# Patient Record
Sex: FEMALE | Race: WHITE | URBAN - NONMETROPOLITAN AREA
[De-identification: names, ages, dates, MRNs, and addresses within clinical notes are randomized per-mention and may not be internally consistent; named-entity substitution may affect disease eponyms.]

---

## 2024-02-04 ENCOUNTER — APPOINTMENT (OUTPATIENT)
Dept: GENERAL RADIOLOGY | Age: 59
DRG: 133 | End: 2024-02-04
Payer: MEDICAID

## 2024-02-04 ENCOUNTER — APPOINTMENT (OUTPATIENT)
Dept: CT IMAGING | Age: 59
DRG: 133 | End: 2024-02-04
Payer: MEDICAID

## 2024-02-04 ENCOUNTER — HOSPITAL ENCOUNTER (INPATIENT)
Age: 59
LOS: 3 days | Discharge: HOME | DRG: 133 | End: 2024-02-07
Attending: EMERGENCY MEDICINE | Admitting: STUDENT IN AN ORGANIZED HEALTH CARE EDUCATION/TRAINING PROGRAM
Payer: MEDICAID

## 2024-02-04 DIAGNOSIS — J44.1 COPD EXACERBATION (HCC): ICD-10-CM

## 2024-02-04 DIAGNOSIS — I48.91 NEW ONSET ATRIAL FIBRILLATION (HCC): ICD-10-CM

## 2024-02-04 DIAGNOSIS — J96.02 ACUTE RESPIRATORY FAILURE WITH HYPOXIA AND HYPERCAPNIA (HCC): Primary | ICD-10-CM

## 2024-02-04 DIAGNOSIS — J96.01 ACUTE RESPIRATORY FAILURE WITH HYPOXIA AND HYPERCAPNIA (HCC): Primary | ICD-10-CM

## 2024-02-04 DIAGNOSIS — J44.9 OSA AND COPD OVERLAP SYNDROME (HCC): ICD-10-CM

## 2024-02-04 DIAGNOSIS — G47.33 OSA AND COPD OVERLAP SYNDROME (HCC): ICD-10-CM

## 2024-02-04 PROBLEM — R07.9 CHEST PAIN: Status: ACTIVE | Noted: 2024-02-04

## 2024-02-04 PROBLEM — R94.31 ACUTE ELECTROCARDIOGRAM CHANGES: Status: ACTIVE | Noted: 2024-02-04

## 2024-02-04 PROBLEM — R79.89 ELEVATED TROPONIN: Status: ACTIVE | Noted: 2024-02-04

## 2024-02-04 LAB
ANION GAP SERPL CALCULATED.3IONS-SCNC: 12 MMOL/L (ref 9–17)
ARTERIAL PATENCY WRIST A: ABNORMAL
BASOPHILS # BLD: 0 K/UL (ref 0–0.2)
BASOPHILS NFR BLD: 0 % (ref 0–2)
BDY SITE: ABNORMAL
BNP SERPL-MCNC: 191 PG/ML
BODY TEMPERATURE: 37
BUN SERPL-MCNC: 9 MG/DL (ref 6–20)
BUN/CREAT SERPL: 18 (ref 9–20)
CALCIUM SERPL-MCNC: 9.3 MG/DL (ref 8.6–10.4)
CHLORIDE SERPL-SCNC: 95 MMOL/L (ref 98–107)
CO2 SERPL-SCNC: 26 MMOL/L (ref 20–31)
CREAT SERPL-MCNC: 0.5 MG/DL (ref 0.5–0.9)
CRP SERPL HS-MCNC: 61.8 MG/L (ref 0–5)
EKG ATRIAL RATE: 103 BPM
EKG ATRIAL RATE: 104 BPM
EKG P AXIS: 68 DEGREES
EKG P AXIS: 72 DEGREES
EKG P-R INTERVAL: 122 MS
EKG P-R INTERVAL: 126 MS
EKG Q-T INTERVAL: 334 MS
EKG Q-T INTERVAL: 338 MS
EKG QRS DURATION: 62 MS
EKG QRS DURATION: 66 MS
EKG QTC CALCULATION (BAZETT): 437 MS
EKG QTC CALCULATION (BAZETT): 444 MS
EKG R AXIS: 22 DEGREES
EKG R AXIS: 24 DEGREES
EKG T AXIS: 34 DEGREES
EKG T AXIS: 38 DEGREES
EKG VENTRICULAR RATE: 103 BPM
EKG VENTRICULAR RATE: 104 BPM
EOSINOPHIL # BLD: 0 K/UL (ref 0–0.44)
EOSINOPHILS RELATIVE PERCENT: 0 % (ref 1–4)
ERYTHROCYTE [DISTWIDTH] IN BLOOD BY AUTOMATED COUNT: 19.3 % (ref 11.8–14.4)
ERYTHROCYTE [SEDIMENTATION RATE] IN BLOOD BY PHOTOMETRIC METHOD: 48 MM/HR (ref 0–30)
FIO2 ON VENT: 35 %
FLUAV AG SPEC QL: NEGATIVE
FLUBV AG SPEC QL: NEGATIVE
GAS FLOW.O2 O2 DELIVERY SYS: ABNORMAL L/MIN
GFR SERPL CREATININE-BSD FRML MDRD: >60 ML/MIN/1.73M2
GLUCOSE SERPL-MCNC: 143 MG/DL (ref 70–99)
HCO3 ARTERIAL: 28.3 MMOL/L (ref 22–26)
HCT VFR BLD AUTO: 64 % (ref 36.3–47.1)
HGB BLD-MCNC: 19.8 G/DL (ref 11.9–15.1)
IMM GRANULOCYTES # BLD AUTO: 0.13 K/UL (ref 0–0.3)
IMM GRANULOCYTES NFR BLD: 1 %
LACTATE BLDV-SCNC: 1.1 MMOL/L (ref 0.5–2.2)
LYMPHOCYTES NFR BLD: 1.07 K/UL (ref 1.1–3.7)
LYMPHOCYTES RELATIVE PERCENT: 8 % (ref 24–43)
MCH RBC QN AUTO: 26.4 PG (ref 25.2–33.5)
MCHC RBC AUTO-ENTMCNC: 30.9 G/DL (ref 28.4–34.8)
MCV RBC AUTO: 85.3 FL (ref 82.6–102.9)
MONOCYTES NFR BLD: 1.21 K/UL (ref 0.1–1.2)
MONOCYTES NFR BLD: 9 % (ref 3–12)
MORPHOLOGY: NORMAL
NEGATIVE BASE EXCESS, ART: 1 MMOL/L (ref 0–2)
NEUTROPHILS NFR BLD: 82 % (ref 36–65)
NEUTS SEG NFR BLD: 10.99 K/UL (ref 1.5–8.1)
NRBC BLD-RTO: 0 PER 100 WBC
O2 SAT, ARTERIAL: 90.8 % (ref 95–98)
PCO2 ARTERIAL: 68.1 MMHG (ref 35–45)
PH ARTERIAL: 7.24 (ref 7.35–7.45)
PLATELET # BLD AUTO: 192 K/UL (ref 138–453)
PMV BLD AUTO: 10.4 FL (ref 8.1–13.5)
PO2 ARTERIAL: 71.1 MMHG (ref 80–100)
POTASSIUM SERPL-SCNC: 4.7 MMOL/L (ref 3.7–5.3)
PT. POSITION: ABNORMAL
RBC # BLD AUTO: 7.5 M/UL (ref 3.95–5.11)
SARS-COV-2 RDRP RESP QL NAA+PROBE: NOT DETECTED
SODIUM SERPL-SCNC: 133 MMOL/L (ref 135–144)
SPECIMEN DESCRIPTION: NORMAL
TROPONIN I SERPL HS-MCNC: 18 NG/L (ref 0–14)
TROPONIN I SERPL HS-MCNC: 19 NG/L (ref 0–14)
WBC OTHER # BLD: 13.4 K/UL (ref 3.5–11.3)

## 2024-02-04 PROCEDURE — 87804 INFLUENZA ASSAY W/OPTIC: CPT

## 2024-02-04 PROCEDURE — 71260 CT THORAX DX C+: CPT

## 2024-02-04 PROCEDURE — 94664 DEMO&/EVAL PT USE INHALER: CPT

## 2024-02-04 PROCEDURE — 84484 ASSAY OF TROPONIN QUANT: CPT

## 2024-02-04 PROCEDURE — 86140 C-REACTIVE PROTEIN: CPT

## 2024-02-04 PROCEDURE — 2580000003 HC RX 258

## 2024-02-04 PROCEDURE — 6360000002 HC RX W HCPCS: Performed by: STUDENT IN AN ORGANIZED HEALTH CARE EDUCATION/TRAINING PROGRAM

## 2024-02-04 PROCEDURE — 96374 THER/PROPH/DIAG INJ IV PUSH: CPT

## 2024-02-04 PROCEDURE — 36600 WITHDRAWAL OF ARTERIAL BLOOD: CPT

## 2024-02-04 PROCEDURE — 83036 HEMOGLOBIN GLYCOSYLATED A1C: CPT

## 2024-02-04 PROCEDURE — 6370000000 HC RX 637 (ALT 250 FOR IP): Performed by: EMERGENCY MEDICINE

## 2024-02-04 PROCEDURE — 94640 AIRWAY INHALATION TREATMENT: CPT

## 2024-02-04 PROCEDURE — 83880 ASSAY OF NATRIURETIC PEPTIDE: CPT

## 2024-02-04 PROCEDURE — 85652 RBC SED RATE AUTOMATED: CPT

## 2024-02-04 PROCEDURE — 6360000002 HC RX W HCPCS: Performed by: EMERGENCY MEDICINE

## 2024-02-04 PROCEDURE — 6360000004 HC RX CONTRAST MEDICATION: Performed by: EMERGENCY MEDICINE

## 2024-02-04 PROCEDURE — 2700000000 HC OXYGEN THERAPY PER DAY

## 2024-02-04 PROCEDURE — 87635 SARS-COV-2 COVID-19 AMP PRB: CPT

## 2024-02-04 PROCEDURE — 82805 BLOOD GASES W/O2 SATURATION: CPT

## 2024-02-04 PROCEDURE — 80048 BASIC METABOLIC PNL TOTAL CA: CPT

## 2024-02-04 PROCEDURE — 1200000000 HC SEMI PRIVATE

## 2024-02-04 PROCEDURE — 99285 EMERGENCY DEPT VISIT HI MDM: CPT

## 2024-02-04 PROCEDURE — 96375 TX/PRO/DX INJ NEW DRUG ADDON: CPT

## 2024-02-04 PROCEDURE — 2580000003 HC RX 258: Performed by: STUDENT IN AN ORGANIZED HEALTH CARE EDUCATION/TRAINING PROGRAM

## 2024-02-04 PROCEDURE — 6370000000 HC RX 637 (ALT 250 FOR IP): Performed by: STUDENT IN AN ORGANIZED HEALTH CARE EDUCATION/TRAINING PROGRAM

## 2024-02-04 PROCEDURE — 94761 N-INVAS EAR/PLS OXIMETRY MLT: CPT

## 2024-02-04 PROCEDURE — 85025 COMPLETE CBC W/AUTO DIFF WBC: CPT

## 2024-02-04 PROCEDURE — 93010 ELECTROCARDIOGRAM REPORT: CPT | Performed by: INTERNAL MEDICINE

## 2024-02-04 PROCEDURE — 83605 ASSAY OF LACTIC ACID: CPT

## 2024-02-04 PROCEDURE — 93005 ELECTROCARDIOGRAM TRACING: CPT | Performed by: EMERGENCY MEDICINE

## 2024-02-04 RX ORDER — ALBUTEROL SULFATE 90 UG/1
2 AEROSOL, METERED RESPIRATORY (INHALATION) 3 TIMES DAILY PRN
COMMUNITY

## 2024-02-04 RX ORDER — SODIUM CHLORIDE 0.9 % (FLUSH) 0.9 %
5-40 SYRINGE (ML) INJECTION EVERY 12 HOURS SCHEDULED
Status: DISCONTINUED | OUTPATIENT
Start: 2024-02-04 | End: 2024-02-07 | Stop reason: HOSPADM

## 2024-02-04 RX ORDER — ACETAMINOPHEN 325 MG/1
650 TABLET ORAL EVERY 6 HOURS PRN
Status: DISCONTINUED | OUTPATIENT
Start: 2024-02-04 | End: 2024-02-07 | Stop reason: HOSPADM

## 2024-02-04 RX ORDER — GUAIFENESIN/DEXTROMETHORPHAN 100-10MG/5
5 SYRUP ORAL EVERY 4 HOURS PRN
Status: DISCONTINUED | OUTPATIENT
Start: 2024-02-04 | End: 2024-02-07 | Stop reason: HOSPADM

## 2024-02-04 RX ORDER — ALBUTEROL SULFATE 2.5 MG/3ML
2.5 SOLUTION RESPIRATORY (INHALATION)
Status: DISCONTINUED | OUTPATIENT
Start: 2024-02-04 | End: 2024-02-06

## 2024-02-04 RX ORDER — ONDANSETRON 4 MG/1
4 TABLET, ORALLY DISINTEGRATING ORAL EVERY 8 HOURS PRN
Status: DISCONTINUED | OUTPATIENT
Start: 2024-02-04 | End: 2024-02-07 | Stop reason: HOSPADM

## 2024-02-04 RX ORDER — ACETAMINOPHEN 650 MG/1
650 SUPPOSITORY RECTAL EVERY 6 HOURS PRN
Status: DISCONTINUED | OUTPATIENT
Start: 2024-02-04 | End: 2024-02-07 | Stop reason: HOSPADM

## 2024-02-04 RX ORDER — ACETAMINOPHEN 500 MG
1000 TABLET ORAL EVERY 6 HOURS PRN
COMMUNITY

## 2024-02-04 RX ORDER — IPRATROPIUM BROMIDE AND ALBUTEROL SULFATE 2.5; .5 MG/3ML; MG/3ML
1 SOLUTION RESPIRATORY (INHALATION)
Status: DISCONTINUED | OUTPATIENT
Start: 2024-02-04 | End: 2024-02-05

## 2024-02-04 RX ORDER — IPRATROPIUM BROMIDE AND ALBUTEROL SULFATE 2.5; .5 MG/3ML; MG/3ML
1 SOLUTION RESPIRATORY (INHALATION)
Status: DISCONTINUED | OUTPATIENT
Start: 2024-02-04 | End: 2024-02-04

## 2024-02-04 RX ORDER — FAMOTIDINE 20 MG/1
20 TABLET, FILM COATED ORAL 2 TIMES DAILY
Status: DISCONTINUED | OUTPATIENT
Start: 2024-02-04 | End: 2024-02-07 | Stop reason: HOSPADM

## 2024-02-04 RX ORDER — FUROSEMIDE 20 MG/1
20 TABLET ORAL DAILY PRN
COMMUNITY

## 2024-02-04 RX ORDER — LEVOFLOXACIN 5 MG/ML
750 INJECTION, SOLUTION INTRAVENOUS ONCE
Status: COMPLETED | OUTPATIENT
Start: 2024-02-04 | End: 2024-02-04

## 2024-02-04 RX ORDER — SODIUM CHLORIDE 0.9 % (FLUSH) 0.9 %
5-40 SYRINGE (ML) INJECTION PRN
Status: DISCONTINUED | OUTPATIENT
Start: 2024-02-04 | End: 2024-02-07 | Stop reason: HOSPADM

## 2024-02-04 RX ORDER — IPRATROPIUM BROMIDE AND ALBUTEROL SULFATE 2.5; .5 MG/3ML; MG/3ML
1 SOLUTION RESPIRATORY (INHALATION) ONCE
Status: COMPLETED | OUTPATIENT
Start: 2024-02-04 | End: 2024-02-04

## 2024-02-04 RX ORDER — METHYLPREDNISOLONE SODIUM SUCCINATE 125 MG/2ML
125 INJECTION, POWDER, LYOPHILIZED, FOR SOLUTION INTRAMUSCULAR; INTRAVENOUS ONCE
Status: COMPLETED | OUTPATIENT
Start: 2024-02-04 | End: 2024-02-04

## 2024-02-04 RX ORDER — WATER 10 ML/10ML
INJECTION INTRAMUSCULAR; INTRAVENOUS; SUBCUTANEOUS
Status: COMPLETED
Start: 2024-02-04 | End: 2024-02-04

## 2024-02-04 RX ORDER — ALBUTEROL SULFATE 2.5 MG/3ML
2.5 SOLUTION RESPIRATORY (INHALATION) EVERY 4 HOURS PRN
Status: DISCONTINUED | OUTPATIENT
Start: 2024-02-04 | End: 2024-02-05 | Stop reason: SDUPTHER

## 2024-02-04 RX ORDER — GUAIFENESIN 600 MG/1
600 TABLET, EXTENDED RELEASE ORAL 2 TIMES DAILY
Status: DISCONTINUED | OUTPATIENT
Start: 2024-02-04 | End: 2024-02-07 | Stop reason: HOSPADM

## 2024-02-04 RX ORDER — POLYETHYLENE GLYCOL 3350 17 G/17G
17 POWDER, FOR SOLUTION ORAL DAILY PRN
Status: DISCONTINUED | OUTPATIENT
Start: 2024-02-04 | End: 2024-02-07 | Stop reason: HOSPADM

## 2024-02-04 RX ORDER — ONDANSETRON 2 MG/ML
4 INJECTION INTRAMUSCULAR; INTRAVENOUS EVERY 6 HOURS PRN
Status: DISCONTINUED | OUTPATIENT
Start: 2024-02-04 | End: 2024-02-07 | Stop reason: HOSPADM

## 2024-02-04 RX ORDER — SODIUM CHLORIDE 9 MG/ML
INJECTION, SOLUTION INTRAVENOUS PRN
Status: DISCONTINUED | OUTPATIENT
Start: 2024-02-04 | End: 2024-02-07 | Stop reason: HOSPADM

## 2024-02-04 RX ORDER — ASPIRIN 81 MG/1
324 TABLET, CHEWABLE ORAL ONCE
Status: COMPLETED | OUTPATIENT
Start: 2024-02-04 | End: 2024-02-04

## 2024-02-04 RX ORDER — PREDNISONE 20 MG/1
40 TABLET ORAL DAILY
Status: DISCONTINUED | OUTPATIENT
Start: 2024-02-06 | End: 2024-02-05

## 2024-02-04 RX ORDER — ENOXAPARIN SODIUM 100 MG/ML
40 INJECTION SUBCUTANEOUS DAILY
Status: DISCONTINUED | OUTPATIENT
Start: 2024-02-04 | End: 2024-02-05

## 2024-02-04 RX ORDER — ALBUTEROL SULFATE 2.5 MG/3ML
2.5 SOLUTION RESPIRATORY (INHALATION)
Status: DISCONTINUED | OUTPATIENT
Start: 2024-02-04 | End: 2024-02-04

## 2024-02-04 RX ORDER — MORPHINE SULFATE 4 MG/ML
4 INJECTION, SOLUTION INTRAMUSCULAR; INTRAVENOUS ONCE
Status: COMPLETED | OUTPATIENT
Start: 2024-02-04 | End: 2024-02-04

## 2024-02-04 RX ORDER — FENTANYL CITRATE 50 UG/ML
25 INJECTION, SOLUTION INTRAMUSCULAR; INTRAVENOUS ONCE
Status: COMPLETED | OUTPATIENT
Start: 2024-02-04 | End: 2024-02-04

## 2024-02-04 RX ORDER — ONDANSETRON 2 MG/ML
4 INJECTION INTRAMUSCULAR; INTRAVENOUS ONCE
Status: COMPLETED | OUTPATIENT
Start: 2024-02-04 | End: 2024-02-04

## 2024-02-04 RX ORDER — FUROSEMIDE 10 MG/ML
40 INJECTION INTRAMUSCULAR; INTRAVENOUS ONCE
Status: COMPLETED | OUTPATIENT
Start: 2024-02-04 | End: 2024-02-04

## 2024-02-04 RX ORDER — BENZONATATE 100 MG/1
100 CAPSULE ORAL 3 TIMES DAILY PRN
Status: DISCONTINUED | OUTPATIENT
Start: 2024-02-04 | End: 2024-02-07 | Stop reason: HOSPADM

## 2024-02-04 RX ADMIN — SODIUM CHLORIDE, PRESERVATIVE FREE 10 ML: 5 INJECTION INTRAVENOUS at 12:30

## 2024-02-04 RX ADMIN — FUROSEMIDE 40 MG: 10 INJECTION, SOLUTION INTRAMUSCULAR; INTRAVENOUS at 10:23

## 2024-02-04 RX ADMIN — FAMOTIDINE 20 MG: 20 TABLET, FILM COATED ORAL at 21:03

## 2024-02-04 RX ADMIN — METHYLPREDNISOLONE SODIUM SUCCINATE 40 MG: 40 INJECTION, POWDER, FOR SOLUTION INTRAMUSCULAR; INTRAVENOUS at 17:13

## 2024-02-04 RX ADMIN — IPRATROPIUM BROMIDE AND ALBUTEROL SULFATE 1 DOSE: .5; 3 SOLUTION RESPIRATORY (INHALATION) at 10:35

## 2024-02-04 RX ADMIN — FENTANYL CITRATE 25 MCG: 50 INJECTION, SOLUTION INTRAMUSCULAR; INTRAVENOUS at 09:56

## 2024-02-04 RX ADMIN — MORPHINE SULFATE 4 MG: 4 INJECTION, SOLUTION INTRAMUSCULAR; INTRAVENOUS at 08:38

## 2024-02-04 RX ADMIN — WATER 2 ML: 1 INJECTION INTRAMUSCULAR; INTRAVENOUS; SUBCUTANEOUS at 10:29

## 2024-02-04 RX ADMIN — ASPIRIN 324 MG: 81 TABLET, CHEWABLE ORAL at 09:31

## 2024-02-04 RX ADMIN — LEVOFLOXACIN 750 MG: 750 INJECTION, SOLUTION INTRAVENOUS at 10:24

## 2024-02-04 RX ADMIN — SODIUM CHLORIDE, PRESERVATIVE FREE 10 ML: 5 INJECTION INTRAVENOUS at 21:05

## 2024-02-04 RX ADMIN — METHYLPREDNISOLONE SODIUM SUCCINATE 40 MG: 40 INJECTION, POWDER, FOR SOLUTION INTRAMUSCULAR; INTRAVENOUS at 21:04

## 2024-02-04 RX ADMIN — IPRATROPIUM BROMIDE AND ALBUTEROL SULFATE 1 DOSE: .5; 3 SOLUTION RESPIRATORY (INHALATION) at 20:45

## 2024-02-04 RX ADMIN — GUAIFENESIN 600 MG: 600 TABLET, EXTENDED RELEASE ORAL at 21:03

## 2024-02-04 RX ADMIN — IPRATROPIUM BROMIDE AND ALBUTEROL SULFATE 1 DOSE: .5; 3 SOLUTION RESPIRATORY (INHALATION) at 15:45

## 2024-02-04 RX ADMIN — CEFTRIAXONE SODIUM 1000 MG: 1 INJECTION, POWDER, FOR SOLUTION INTRAMUSCULAR; INTRAVENOUS at 13:49

## 2024-02-04 RX ADMIN — IOPAMIDOL 75 ML: 755 INJECTION, SOLUTION INTRAVENOUS at 09:32

## 2024-02-04 RX ADMIN — METHYLPREDNISOLONE SODIUM SUCCINATE 125 MG: 125 INJECTION INTRAMUSCULAR; INTRAVENOUS at 10:29

## 2024-02-04 RX ADMIN — AZITHROMYCIN MONOHYDRATE 500 MG: 500 INJECTION, POWDER, LYOPHILIZED, FOR SOLUTION INTRAVENOUS at 14:35

## 2024-02-04 RX ADMIN — ONDANSETRON 4 MG: 2 INJECTION INTRAMUSCULAR; INTRAVENOUS at 08:38

## 2024-02-04 RX ADMIN — ENOXAPARIN SODIUM 40 MG: 100 INJECTION SUBCUTANEOUS at 13:49

## 2024-02-04 RX ADMIN — GUAIFENESIN 600 MG: 600 TABLET, EXTENDED RELEASE ORAL at 13:50

## 2024-02-04 ASSESSMENT — PAIN DESCRIPTION - LOCATION
LOCATION: CHEST
LOCATION: CHEST

## 2024-02-04 ASSESSMENT — PAIN SCALES - GENERAL
PAINLEVEL_OUTOF10: 10
PAINLEVEL_OUTOF10: 1
PAINLEVEL_OUTOF10: 10
PAINLEVEL_OUTOF10: 0

## 2024-02-04 ASSESSMENT — PAIN DESCRIPTION - PAIN TYPE: TYPE: ACUTE PAIN

## 2024-02-04 ASSESSMENT — PAIN DESCRIPTION - ORIENTATION: ORIENTATION: ANTERIOR

## 2024-02-04 ASSESSMENT — LIFESTYLE VARIABLES
HOW MANY STANDARD DRINKS CONTAINING ALCOHOL DO YOU HAVE ON A TYPICAL DAY: 1 OR 2
HOW OFTEN DO YOU HAVE A DRINK CONTAINING ALCOHOL: MONTHLY OR LESS
HOW OFTEN DO YOU HAVE A DRINK CONTAINING ALCOHOL: NEVER
HOW MANY STANDARD DRINKS CONTAINING ALCOHOL DO YOU HAVE ON A TYPICAL DAY: PATIENT DOES NOT DRINK

## 2024-02-04 ASSESSMENT — PAIN DESCRIPTION - DESCRIPTORS: DESCRIPTORS: PRESSURE

## 2024-02-04 ASSESSMENT — PAIN DESCRIPTION - FREQUENCY: FREQUENCY: CONTINUOUS

## 2024-02-04 ASSESSMENT — PAIN - FUNCTIONAL ASSESSMENT: PAIN_FUNCTIONAL_ASSESSMENT: 0-10

## 2024-02-04 NOTE — ED PROVIDER NOTES
hypertension.   3. Fatty liver.   4. Atheromatous plaque and atherosclerotic calcification of the aorta and   branch vasculature.      RECOMMENDATIONS:   Multiple pulmonary nodules. Most significant: 5 mm left solid pulmonary   nodule within the upper lobe. Per Fleischner Society Guidelines, if patient   is low risk for malignancy, no routine follow-up imaging is recommended. If   patient is high risk for malignancy, a non-contrast Chest CT at 12 months is   optional. If performed and the nodule is stable at 12 months, no further   follow-up is recommended.      These guidelines do not apply to immunocompromised patients and patients with   cancer. Follow up in patients with significant comorbidities as clinically   warranted. For lung cancer screening, adhere to Lung-RADS guidelines.   Reference: Radiology. 2017; 284(1):228-43.                  ED BEDSIDE ULTRASOUND:   Performed by ED Physician - none    LABS:  Labs Reviewed   BASIC METABOLIC PANEL - Abnormal; Notable for the following components:       Result Value    Sodium 133 (*)     Chloride 95 (*)     Glucose 143 (*)     All other components within normal limits   CBC WITH AUTO DIFFERENTIAL - Abnormal; Notable for the following components:    WBC 13.4 (*)     RBC 7.50 (*)     Hemoglobin 19.8 (*)     Hematocrit 64.0 (*)     RDW 19.3 (*)     Neutrophils % 82 (*)     Lymphocytes % 8 (*)     Eosinophils % 0 (*)     Immature Granulocytes 1 (*)     Neutrophils Absolute 10.99 (*)     Lymphocytes Absolute 1.07 (*)     Monocytes Absolute 1.21 (*)     All other components within normal limits   TROPONIN - Abnormal; Notable for the following components:    Troponin, High Sensitivity 18 (*)     All other components within normal limits   C-REACTIVE PROTEIN - Abnormal; Notable for the following components:    CRP 61.8 (*)     All other components within normal limits   SEDIMENTATION RATE - Abnormal; Notable for the following components:    Sed Rate 48 (*)     All other

## 2024-02-04 NOTE — H&P
[address] , [place], [place], [address]    History & Physical Examination Note              Date:   2/4/2024  Patient name:  Sully Thomas  Date of admission:  2/4/2024  8:16 AM  MRN:   [id number]  YOB: 1965    CHIEF COMPLAINT:       Chief Complaint   Patient presents with    Shortness of Breath     with associated substernal CP starting last night, worsening with exertion       History Obtained From:  Patient and chart review.    HPI:     The patient is a 58 y.o.  female who presented to the ED with concerns regarding mid chest wall pain that started sometime last night.  Patient indicates that the chest pain has been associated with breathing and any exertion.  She has been having some associated shortness of breath which is worse than her baseline.  She does have a history of smoking for many years with suspected COPD/taking albuterol as needed.  She does not have any known history of CAD, CHF but had also been placed on a diuretics about 2 weeks prior for excessive fluid of the lower extremity which had since resolved.  She has no history of any known sick contacts, no fevers no chills.  She has been having a productive cough with associated phlegm.   In the ED, labs and imaging were obtained and were reviewed. The case was discussed with the ED Provider prior to admission.  Cardiology had been consulted in the ED given acute EKG changes.  She did have elevated troponins.  CT imaging has been reviewed.  She was noted to be in acute hypoxic respiratory failure likely secondary to COPD exacerbation.  She was provided with supplemental oxygen which she does not wear at home as well as breathing treatments.    PAST MEDICAL HISTORY:     History reviewed. No pertinent past medical history.  Reviewed and non-contributory or as noted above and/or in the HPI    PAST SURGICAL HISTORY:      has a past surgical history that includes Skin graft (Left, 1969); Carpal tunnel release

## 2024-02-05 ENCOUNTER — APPOINTMENT (OUTPATIENT)
Age: 59
DRG: 133 | End: 2024-02-05
Attending: STUDENT IN AN ORGANIZED HEALTH CARE EDUCATION/TRAINING PROGRAM
Payer: MEDICAID

## 2024-02-05 PROBLEM — I30.0 ACUTE IDIOPATHIC PERICARDITIS: Status: ACTIVE | Noted: 2024-02-05

## 2024-02-05 PROBLEM — E11.9 TYPE 2 DIABETES MELLITUS WITHOUT COMPLICATION, WITHOUT LONG-TERM CURRENT USE OF INSULIN (HCC): Status: ACTIVE | Noted: 2024-02-05

## 2024-02-05 LAB
ALBUMIN SERPL-MCNC: 3.6 G/DL (ref 3.5–5.2)
ALBUMIN/GLOB SERPL: 0.9 {RATIO} (ref 1–2.5)
ALP SERPL-CCNC: 78 U/L (ref 35–104)
ALT SERPL-CCNC: 11 U/L (ref 5–33)
ANION GAP SERPL CALCULATED.3IONS-SCNC: 18 MMOL/L (ref 9–17)
AST SERPL-CCNC: 19 U/L
BASOPHILS # BLD: 0.05 K/UL (ref 0–0.2)
BASOPHILS NFR BLD: 0 % (ref 0–2)
BILIRUB SERPL-MCNC: 0.3 MG/DL (ref 0.3–1.2)
BNP SERPL-MCNC: 888 PG/ML
BUN SERPL-MCNC: 14 MG/DL (ref 6–20)
BUN/CREAT SERPL: 20 (ref 9–20)
CALCIUM SERPL-MCNC: 9.4 MG/DL (ref 8.6–10.4)
CHLORIDE SERPL-SCNC: 94 MMOL/L (ref 98–107)
CHOLEST SERPL-MCNC: 206 MG/DL
CHOLESTEROL/HDL RATIO: 3.9
CO2 SERPL-SCNC: 22 MMOL/L (ref 20–31)
CREAT SERPL-MCNC: 0.7 MG/DL (ref 0.5–0.9)
CRP SERPL HS-MCNC: 242.1 MG/L (ref 0–5)
ECHO AO ROOT DIAM: 3.2 CM
ECHO AO ROOT INDEX: 1.57 CM/M2
ECHO AV ACCELERATION TIME: 59.4 MS
ECHO AV CUSP MM: 1.7 CM
ECHO AV MEAN GRADIENT: 4 MMHG
ECHO AV MEAN VELOCITY: 1 M/S
ECHO AV PEAK VELOCITY: 1.3 M/S
ECHO AV VTI: 24.5 CM
ECHO BSA: 2.11 M2
ECHO EST RA PRESSURE: 3 MMHG
ECHO LA DIAMETER INDEX: 2.16 CM/M2
ECHO LA DIAMETER: 4.4 CM
ECHO LA MAJOR AXIS: 5.2 CM
ECHO LA TO AORTIC ROOT RATIO: 1.38
ECHO LA VOL BP: 36 ML (ref 22–52)
ECHO LA VOL MOD A2C: 40 ML (ref 22–52)
ECHO LA VOL MOD A4C: 32 ML (ref 22–52)
ECHO LA VOL/BSA BIPLANE: 18 ML/M2 (ref 16–34)
ECHO LA VOLUME AREA LENGTH: 38 ML
ECHO LA VOLUME INDEX AREA LENGTH: 19 ML/M2 (ref 16–34)
ECHO LA VOLUME INDEX MOD A2C: 20 ML/M2 (ref 16–34)
ECHO LA VOLUME INDEX MOD A4C: 16 ML/M2 (ref 16–34)
ECHO LV E' LATERAL VELOCITY: 14 CM/S
ECHO LV EDV A2C: 26 ML
ECHO LV EDV A4C: 44 ML
ECHO LV EDV BP: 36 ML (ref 56–104)
ECHO LV EDV INDEX A4C: 22 ML/M2
ECHO LV EDV INDEX BP: 18 ML/M2
ECHO LV EDV NDEX A2C: 13 ML/M2
ECHO LV EJECTION FRACTION BIPLANE: 64 % (ref 55–100)
ECHO LV ESV A2C: 10 ML
ECHO LV ESV A4C: 17 ML
ECHO LV ESV BP: 13 ML (ref 19–49)
ECHO LV ESV INDEX A2C: 5 ML/M2
ECHO LV ESV INDEX A4C: 8 ML/M2
ECHO LV ESV INDEX BP: 6 ML/M2
ECHO LV FRACTIONAL SHORTENING: 42 % (ref 28–44)
ECHO LV INTERNAL DIMENSION DIASTOLE INDEX: 2.55 CM/M2
ECHO LV INTERNAL DIMENSION DIASTOLIC: 5.2 CM (ref 3.9–5.3)
ECHO LV INTERNAL DIMENSION SYSTOLIC INDEX: 1.47 CM/M2
ECHO LV INTERNAL DIMENSION SYSTOLIC: 3 CM
ECHO LV IVSD: 1.2 CM (ref 0.6–0.9)
ECHO LV IVSS: 1.4 CM
ECHO LV MASS 2D: 248.8 G (ref 67–162)
ECHO LV MASS INDEX 2D: 122 G/M2 (ref 43–95)
ECHO LV POSTERIOR WALL DIASTOLIC: 1.2 CM (ref 0.6–0.9)
ECHO LV POSTERIOR WALL SYSTOLIC: 1.5 CM
ECHO LV RELATIVE WALL THICKNESS RATIO: 0.46
ECHO MV A VELOCITY: 0.69 M/S
ECHO MV E DECELERATION TIME (DT): 189.7 MS
ECHO MV E VELOCITY: 0.82 M/S
ECHO MV E/A RATIO: 1.19
ECHO MV E/E' LATERAL: 5.86
ECHO PV MAX VELOCITY: 0.8 M/S
ECHO RIGHT VENTRICULAR SYSTOLIC PRESSURE (RVSP): 38 MMHG
ECHO TV REGURGITANT MAX VELOCITY: 2.96 M/S
EKG ATRIAL RATE: 101 BPM
EKG ATRIAL RATE: 163 BPM
EKG ATRIAL RATE: 86 BPM
EKG P AXIS: 72 DEGREES
EKG P AXIS: 77 DEGREES
EKG P-R INTERVAL: 122 MS
EKG P-R INTERVAL: 124 MS
EKG Q-T INTERVAL: 312 MS
EKG Q-T INTERVAL: 350 MS
EKG Q-T INTERVAL: 372 MS
EKG QRS DURATION: 84 MS
EKG QRS DURATION: 90 MS
EKG QRS DURATION: 96 MS
EKG QTC CALCULATION (BAZETT): 445 MS
EKG QTC CALCULATION (BAZETT): 453 MS
EKG QTC CALCULATION (BAZETT): 460 MS
EKG R AXIS: 40 DEGREES
EKG R AXIS: 57 DEGREES
EKG R AXIS: 67 DEGREES
EKG T AXIS: 22 DEGREES
EKG T AXIS: 30 DEGREES
EKG T AXIS: 39 DEGREES
EKG VENTRICULAR RATE: 101 BPM
EKG VENTRICULAR RATE: 131 BPM
EKG VENTRICULAR RATE: 86 BPM
EOSINOPHIL # BLD: <0.03 K/UL (ref 0–0.44)
EOSINOPHILS RELATIVE PERCENT: 0 % (ref 1–4)
ERYTHROCYTE [DISTWIDTH] IN BLOOD BY AUTOMATED COUNT: 18.8 % (ref 11.8–14.4)
EST. AVERAGE GLUCOSE BLD GHB EST-MCNC: 146 MG/DL
EST. AVERAGE GLUCOSE BLD GHB EST-MCNC: 146 MG/DL
GFR SERPL CREATININE-BSD FRML MDRD: >60 ML/MIN/1.73M2
GLUCOSE SERPL-MCNC: 150 MG/DL (ref 70–99)
HBA1C MFR BLD: 6.7 % (ref 4–6)
HBA1C MFR BLD: 6.7 % (ref 4–6)
HCT VFR BLD AUTO: 59.8 % (ref 36.3–47.1)
HDLC SERPL-MCNC: 53 MG/DL
HGB BLD-MCNC: 18.4 G/DL (ref 11.9–15.1)
IMM GRANULOCYTES # BLD AUTO: 0.1 K/UL (ref 0–0.3)
IMM GRANULOCYTES NFR BLD: 1 %
LDLC SERPL CALC-MCNC: 139 MG/DL (ref 0–130)
LYMPHOCYTES NFR BLD: 0.94 K/UL (ref 1.1–3.7)
LYMPHOCYTES RELATIVE PERCENT: 5 % (ref 24–43)
MCH RBC QN AUTO: 26.1 PG (ref 25.2–33.5)
MCHC RBC AUTO-ENTMCNC: 30.8 G/DL (ref 28.4–34.8)
MCV RBC AUTO: 84.8 FL (ref 82.6–102.9)
MONOCYTES NFR BLD: 1.16 K/UL (ref 0.1–1.2)
MONOCYTES NFR BLD: 6 % (ref 3–12)
NEUTROPHILS NFR BLD: 89 % (ref 36–65)
NEUTS SEG NFR BLD: 17.52 K/UL (ref 1.5–8.1)
NRBC BLD-RTO: 0 PER 100 WBC
PLATELET # BLD AUTO: 175 K/UL (ref 138–453)
PMV BLD AUTO: 10.4 FL (ref 8.1–13.5)
POTASSIUM SERPL-SCNC: 5.1 MMOL/L (ref 3.7–5.3)
PROCALCITONIN SERPL-MCNC: 0.17 NG/ML (ref 0–0.09)
PROT SERPL-MCNC: 7.6 G/DL (ref 6.4–8.3)
RBC # BLD AUTO: 7.05 M/UL (ref 3.95–5.11)
SODIUM SERPL-SCNC: 134 MMOL/L (ref 135–144)
T4 FREE SERPL-MCNC: 1 NG/DL (ref 0.93–1.7)
TRIGL SERPL-MCNC: 68 MG/DL
TROPONIN I SERPL HS-MCNC: 17 NG/L (ref 0–14)
TSH SERPL DL<=0.05 MIU/L-ACNC: 0.37 UIU/ML (ref 0.3–5)
WBC OTHER # BLD: 19.8 K/UL (ref 3.5–11.3)

## 2024-02-05 PROCEDURE — 2580000003 HC RX 258

## 2024-02-05 PROCEDURE — 84439 ASSAY OF FREE THYROXINE: CPT

## 2024-02-05 PROCEDURE — 84145 PROCALCITONIN (PCT): CPT

## 2024-02-05 PROCEDURE — 97162 PT EVAL MOD COMPLEX 30 MIN: CPT

## 2024-02-05 PROCEDURE — 2500000003 HC RX 250 WO HCPCS

## 2024-02-05 PROCEDURE — 93306 TTE W/DOPPLER COMPLETE: CPT | Performed by: INTERNAL MEDICINE

## 2024-02-05 PROCEDURE — 6360000002 HC RX W HCPCS

## 2024-02-05 PROCEDURE — 80061 LIPID PANEL: CPT

## 2024-02-05 PROCEDURE — 94640 AIRWAY INHALATION TREATMENT: CPT

## 2024-02-05 PROCEDURE — 85025 COMPLETE CBC W/AUTO DIFF WBC: CPT

## 2024-02-05 PROCEDURE — 80053 COMPREHEN METABOLIC PANEL: CPT

## 2024-02-05 PROCEDURE — 93010 ELECTROCARDIOGRAM REPORT: CPT | Performed by: FAMILY MEDICINE

## 2024-02-05 PROCEDURE — 6370000000 HC RX 637 (ALT 250 FOR IP): Performed by: STUDENT IN AN ORGANIZED HEALTH CARE EDUCATION/TRAINING PROGRAM

## 2024-02-05 PROCEDURE — 83880 ASSAY OF NATRIURETIC PEPTIDE: CPT

## 2024-02-05 PROCEDURE — 94664 DEMO&/EVAL PT USE INHALER: CPT

## 2024-02-05 PROCEDURE — 6370000000 HC RX 637 (ALT 250 FOR IP): Performed by: NURSE PRACTITIONER

## 2024-02-05 PROCEDURE — 2000000000 HC ICU R&B

## 2024-02-05 PROCEDURE — 6370000000 HC RX 637 (ALT 250 FOR IP)

## 2024-02-05 PROCEDURE — 93005 ELECTROCARDIOGRAM TRACING: CPT

## 2024-02-05 PROCEDURE — 6360000002 HC RX W HCPCS: Performed by: STUDENT IN AN ORGANIZED HEALTH CARE EDUCATION/TRAINING PROGRAM

## 2024-02-05 PROCEDURE — 93306 TTE W/DOPPLER COMPLETE: CPT

## 2024-02-05 PROCEDURE — 83036 HEMOGLOBIN GLYCOSYLATED A1C: CPT

## 2024-02-05 PROCEDURE — 86140 C-REACTIVE PROTEIN: CPT

## 2024-02-05 PROCEDURE — 84443 ASSAY THYROID STIM HORMONE: CPT

## 2024-02-05 PROCEDURE — 93005 ELECTROCARDIOGRAM TRACING: CPT | Performed by: NURSE PRACTITIONER

## 2024-02-05 PROCEDURE — 2580000003 HC RX 258: Performed by: STUDENT IN AN ORGANIZED HEALTH CARE EDUCATION/TRAINING PROGRAM

## 2024-02-05 PROCEDURE — 84484 ASSAY OF TROPONIN QUANT: CPT

## 2024-02-05 PROCEDURE — 94761 N-INVAS EAR/PLS OXIMETRY MLT: CPT

## 2024-02-05 PROCEDURE — 99223 1ST HOSP IP/OBS HIGH 75: CPT | Performed by: INTERNAL MEDICINE

## 2024-02-05 RX ORDER — PREDNISONE 20 MG/1
40 TABLET ORAL DAILY
Status: DISCONTINUED | OUTPATIENT
Start: 2024-02-06 | End: 2024-02-07 | Stop reason: HOSPADM

## 2024-02-05 RX ORDER — ASPIRIN 325 MG
325 TABLET ORAL ONCE
Status: COMPLETED | OUTPATIENT
Start: 2024-02-05 | End: 2024-02-05

## 2024-02-05 RX ORDER — WATER 10 ML/10ML
INJECTION INTRAMUSCULAR; INTRAVENOUS; SUBCUTANEOUS
Status: COMPLETED
Start: 2024-02-05 | End: 2024-02-05

## 2024-02-05 RX ORDER — COLCHICINE 0.6 MG/1
0.6 TABLET ORAL DAILY
Status: DISCONTINUED | OUTPATIENT
Start: 2024-02-06 | End: 2024-02-07

## 2024-02-05 RX ORDER — METHYLPREDNISOLONE SODIUM SUCCINATE 40 MG/ML
40 INJECTION, POWDER, LYOPHILIZED, FOR SOLUTION INTRAMUSCULAR; INTRAVENOUS EVERY 6 HOURS
Status: COMPLETED | OUTPATIENT
Start: 2024-02-05 | End: 2024-02-05

## 2024-02-05 RX ORDER — DILTIAZEM HYDROCHLORIDE 5 MG/ML
10 INJECTION INTRAVENOUS ONCE
Status: COMPLETED | OUTPATIENT
Start: 2024-02-05 | End: 2024-02-05

## 2024-02-05 RX ORDER — METOPROLOL TARTRATE 50 MG/1
50 TABLET, FILM COATED ORAL 2 TIMES DAILY
Status: DISCONTINUED | OUTPATIENT
Start: 2024-02-05 | End: 2024-02-07 | Stop reason: HOSPADM

## 2024-02-05 RX ORDER — ATORVASTATIN CALCIUM 40 MG/1
40 TABLET, FILM COATED ORAL NIGHTLY
Status: DISCONTINUED | OUTPATIENT
Start: 2024-02-05 | End: 2024-02-07 | Stop reason: HOSPADM

## 2024-02-05 RX ORDER — COLCHICINE 0.6 MG/1
1.2 TABLET ORAL ONCE
Status: COMPLETED | OUTPATIENT
Start: 2024-02-05 | End: 2024-02-05

## 2024-02-05 RX ORDER — ASPIRIN 81 MG/1
81 TABLET, CHEWABLE ORAL DAILY
Status: DISCONTINUED | OUTPATIENT
Start: 2024-02-06 | End: 2024-02-07 | Stop reason: HOSPADM

## 2024-02-05 RX ADMIN — SODIUM CHLORIDE, PRESERVATIVE FREE 10 ML: 5 INJECTION INTRAVENOUS at 21:11

## 2024-02-05 RX ADMIN — METFORMIN HYDROCHLORIDE 500 MG: 500 TABLET ORAL at 17:23

## 2024-02-05 RX ADMIN — WATER 10 ML: 1 INJECTION INTRAMUSCULAR; INTRAVENOUS; SUBCUTANEOUS at 16:20

## 2024-02-05 RX ADMIN — IPRATROPIUM BROMIDE AND ALBUTEROL SULFATE 1 DOSE: .5; 3 SOLUTION RESPIRATORY (INHALATION) at 15:24

## 2024-02-05 RX ADMIN — GUAIFENESIN 600 MG: 600 TABLET, EXTENDED RELEASE ORAL at 21:11

## 2024-02-05 RX ADMIN — ENOXAPARIN SODIUM 40 MG: 100 INJECTION SUBCUTANEOUS at 09:49

## 2024-02-05 RX ADMIN — SODIUM CHLORIDE, PRESERVATIVE FREE 10 ML: 5 INJECTION INTRAVENOUS at 09:50

## 2024-02-05 RX ADMIN — APIXABAN 5 MG: 5 TABLET, FILM COATED ORAL at 23:45

## 2024-02-05 RX ADMIN — METHYLPREDNISOLONE SODIUM SUCCINATE 40 MG: 40 INJECTION, POWDER, FOR SOLUTION INTRAMUSCULAR; INTRAVENOUS at 04:50

## 2024-02-05 RX ADMIN — WATER 10 ML: 1 INJECTION INTRAMUSCULAR; INTRAVENOUS; SUBCUTANEOUS at 21:11

## 2024-02-05 RX ADMIN — METHYLPREDNISOLONE SODIUM SUCCINATE 40 MG: 40 INJECTION INTRAMUSCULAR; INTRAVENOUS at 21:11

## 2024-02-05 RX ADMIN — WATER 10 ML: 1 INJECTION INTRAMUSCULAR; INTRAVENOUS; SUBCUTANEOUS at 10:52

## 2024-02-05 RX ADMIN — AZITHROMYCIN MONOHYDRATE 500 MG: 500 INJECTION, POWDER, LYOPHILIZED, FOR SOLUTION INTRAVENOUS at 11:36

## 2024-02-05 RX ADMIN — DILTIAZEM HYDROCHLORIDE 10 MG: 5 INJECTION, SOLUTION INTRAVENOUS at 22:20

## 2024-02-05 RX ADMIN — CEFTRIAXONE SODIUM 1000 MG: 1 INJECTION, POWDER, FOR SOLUTION INTRAMUSCULAR; INTRAVENOUS at 10:59

## 2024-02-05 RX ADMIN — FAMOTIDINE 20 MG: 20 TABLET, FILM COATED ORAL at 21:11

## 2024-02-05 RX ADMIN — METHYLPREDNISOLONE SODIUM SUCCINATE 40 MG: 40 INJECTION INTRAMUSCULAR; INTRAVENOUS at 16:20

## 2024-02-05 RX ADMIN — IPRATROPIUM BROMIDE AND ALBUTEROL SULFATE 1 DOSE: .5; 3 SOLUTION RESPIRATORY (INHALATION) at 09:39

## 2024-02-05 RX ADMIN — ATORVASTATIN CALCIUM 40 MG: 40 TABLET, FILM COATED ORAL at 21:10

## 2024-02-05 RX ADMIN — METOPROLOL TARTRATE 50 MG: 50 TABLET, FILM COATED ORAL at 23:45

## 2024-02-05 RX ADMIN — COLCHICINE 1.2 MG: 0.6 TABLET ORAL at 17:23

## 2024-02-05 RX ADMIN — ASPIRIN 325 MG: 325 TABLET ORAL at 10:52

## 2024-02-05 RX ADMIN — AMIODARONE HYDROCHLORIDE 1 MG/MIN: 50 INJECTION, SOLUTION INTRAVENOUS at 23:48

## 2024-02-05 RX ADMIN — FAMOTIDINE 20 MG: 20 TABLET, FILM COATED ORAL at 09:50

## 2024-02-05 RX ADMIN — METHYLPREDNISOLONE SODIUM SUCCINATE 40 MG: 40 INJECTION INTRAMUSCULAR; INTRAVENOUS at 10:51

## 2024-02-05 RX ADMIN — GUAIFENESIN 600 MG: 600 TABLET, EXTENDED RELEASE ORAL at 09:50

## 2024-02-05 ASSESSMENT — PAIN SCALES - GENERAL: PAINLEVEL_OUTOF10: 0

## 2024-02-05 NOTE — CARE COORDINATION
Pts [employer] , Phyllis left voice mail to offer services for discharge planning if needed.    Phone [phone]  MARISELA Washburn RN

## 2024-02-05 NOTE — CARE COORDINATION
Case Management Assessment  Initial Evaluation    Date/Time of Evaluation: 2/5/2024 3:18 PM  Assessment Completed by: Deborah Washburn RN    If patient is discharged prior to next notation, then this note serves as note for discharge by case management.    Patient Name: Sully Thomas                   YOB: 1965  Diagnosis: COPD exacerbation (HCC) [J44.1]  Acute respiratory failure with hypoxia and hypercapnia (HCC) [J96.01, J96.02]  Acute hypoxic respiratory failure (HCC) [J96.01]                   Date / Time: 2/4/2024  8:16 AM    Patient Admission Status: Inpatient   Readmission Risk (Low < 19, Mod (19-27), High > 27): Readmission Risk Score: 8.5    Current PCP: Lionel Covarrubias MD  PCP verified by CM? Yes    Chart Reviewed: Yes      History Provided by: Patient  Patient Orientation: Alert and Oriented, Person, Place, Situation, Self    Patient Cognition: Alert    Hospitalization in the last 30 days (Readmission):  No    If yes, Readmission Assessment in  Navigator will be completed.    Advance Directives:      Code Status: Full Code   Patient's Primary Decision Maker is: Legal Next of Kin    Primary Decision Maker: Jose Daniel Thomas - Brother/Sister - [phone]    Secondary Decision Maker: Nataly Sousa - Aunt/Uncle - [phone]    Supplemental (Other) Decision Maker: Cristiano Abdi - Other - [phone]    Discharge Planning:    Patient lives with: Spouse/Significant Other Type of Home: House  Primary Care Giver: Self  Patient Support Systems include: None, Children   Current Financial resources: Medicaid  Current community resources: None  Current services prior to admission: None            Current DME:              Type of Home Care services:  None    ADLS  Prior functional level: Independent in ADLs/IADLs  Current functional level: Independent in ADLs/IADLs    PT AM-PAC:   /24  OT AM-PAC:   /24    Family can provide assistance at DC: Yes (limited to brother and aunt)  Would you like

## 2024-02-05 NOTE — RT PROTOCOL NOTE
RESPIRATORY ASSESSMENT PROTOCOL                                                                                              Patient Name: Sully Thomas Room#: [id number] : 1965     Admitting diagnosis: COPD exacerbation (HCC) [J44.1]  Acute respiratory failure with hypoxia and hypercapnia (HCC) [J96.01, J96.02]  Acute hypoxic respiratory failure (HCC) [J96.01]       Medical History: History reviewed. No pertinent past medical history.    PATIENT ASSESSMENT    LABORATORY DATA  Hematology:   Lab Results   Component Value Date/Time    WBC 19.8 2024 05:00 AM    RBC 7.05 2024 05:00 AM    HGB 18.4 2024 05:00 AM    HCT 59.8 2024 05:00 AM     2024 05:00 AM     Chemistry:    Lab Results   Component Value Date/Time    PHART 7.236 2024 08:55 AM    RWZ2BIN 68.1 2024 08:55 AM    PO2ART 71.1 2024 08:55 AM    G2MFNUZF 90.8 2024 08:55 AM    GJX0WTO 28.3 2024 08:55 AM    NBEA 1.0 2024 08:55 AM       VITALS  Pulse: 92   Respirations: 18  BP: 120/70  SpO2: 91 % O2 Device: Nasal cannula  Temp: 98.1 °F (36.7 °C)    SKIN COLOR  [x] Normal  [] Pale  [] Dusky  [] Cyanotic    RESPIRATORY PATTERN  [] Normal  [x] Dyspnea  [] Cheyne-Schilling  [] Kussmaul  [] Biots    AMBULATORY  [x] Yes  [] No  [x] With Assistance    PEAK FLOW  Predicted:     Personal Best:           Patient Acuity 0 1 2 3 4 Score   Level of Consciousness (LOC) [x]  Alert & Oriented or Pt normal LOC []  Confused;follows directions []  Confused & uncooper-ative []  Obtunded []  Comatose 0   Respiratory Rate  (RR) []  Reg. rate & pattern. 12 - 20 bpm  []  Increased RR. Greater than 20 bpm   [x]  SOB w/ exertion or RR greater than 24 bpm []  Access- ory muscle use at rest. Abn.  resp. []  SOB at rest.   2   Bilateral Breath Sounds (BBS) []  Clear [x]  Diminish-ed bases  []  Diminish-ed t/o, or rales   []  Sporadic, scattered wheezes or rhonchi []  Persistentwheezes and, or absent BBS 1   Cough

## 2024-02-06 ENCOUNTER — APPOINTMENT (OUTPATIENT)
Dept: CT IMAGING | Age: 59
DRG: 133 | End: 2024-02-06
Payer: MEDICAID

## 2024-02-06 PROBLEM — J96.02 ACUTE RESPIRATORY FAILURE WITH HYPOXIA AND HYPERCAPNIA (HCC): Status: ACTIVE | Noted: 2024-02-04

## 2024-02-06 PROBLEM — I48.91 NEW ONSET ATRIAL FIBRILLATION (HCC): Status: ACTIVE | Noted: 2024-02-06

## 2024-02-06 LAB
ALBUMIN SERPL-MCNC: 3.2 G/DL (ref 3.5–5.2)
ALBUMIN/GLOB SERPL: 0.9 {RATIO} (ref 1–2.5)
ALP SERPL-CCNC: 64 U/L (ref 35–104)
ALT SERPL-CCNC: 11 U/L (ref 5–33)
ANION GAP SERPL CALCULATED.3IONS-SCNC: 12 MMOL/L (ref 9–17)
ARTERIAL PATENCY WRIST A: ABNORMAL
AST SERPL-CCNC: 9 U/L
BASOPHILS # BLD: 0.04 K/UL (ref 0–0.2)
BASOPHILS NFR BLD: 0 % (ref 0–2)
BDY SITE: ABNORMAL
BILIRUB SERPL-MCNC: 0.2 MG/DL (ref 0.3–1.2)
BNP SERPL-MCNC: 1815 PG/ML
BODY TEMPERATURE: 37
BUN SERPL-MCNC: 18 MG/DL (ref 6–20)
BUN/CREAT SERPL: 30 (ref 9–20)
CALCIUM SERPL-MCNC: 8.8 MG/DL (ref 8.6–10.4)
CHLORIDE SERPL-SCNC: 97 MMOL/L (ref 98–107)
CO2 SERPL-SCNC: 26 MMOL/L (ref 20–31)
CREAT SERPL-MCNC: 0.6 MG/DL (ref 0.5–0.9)
CRP SERPL HS-MCNC: 103 MG/L (ref 0–5)
EOSINOPHIL # BLD: <0.03 K/UL (ref 0–0.44)
EOSINOPHILS RELATIVE PERCENT: 0 % (ref 1–4)
ERYTHROCYTE [DISTWIDTH] IN BLOOD BY AUTOMATED COUNT: 19.4 % (ref 11.8–14.4)
FIO2 ON VENT: 28 %
GAS FLOW.O2 O2 DELIVERY SYS: ABNORMAL L/MIN
GFR SERPL CREATININE-BSD FRML MDRD: >60 ML/MIN/1.73M2
GLUCOSE SERPL-MCNC: 161 MG/DL (ref 70–99)
HCO3 ARTERIAL: 22.4 MMOL/L (ref 22–26)
HCT VFR BLD AUTO: 58.7 % (ref 36.3–47.1)
HGB BLD-MCNC: 18.3 G/DL (ref 11.9–15.1)
IMM GRANULOCYTES # BLD AUTO: 0.07 K/UL (ref 0–0.3)
IMM GRANULOCYTES NFR BLD: 0 %
LYMPHOCYTES NFR BLD: 1.16 K/UL (ref 1.1–3.7)
LYMPHOCYTES RELATIVE PERCENT: 7 % (ref 24–43)
MCH RBC QN AUTO: 26.2 PG (ref 25.2–33.5)
MCHC RBC AUTO-ENTMCNC: 31.2 G/DL (ref 28.4–34.8)
MCV RBC AUTO: 84.1 FL (ref 82.6–102.9)
MONOCYTES NFR BLD: 1.05 K/UL (ref 0.1–1.2)
MONOCYTES NFR BLD: 6 % (ref 3–12)
NEGATIVE BASE EXCESS, ART: 1.9 MMOL/L (ref 0–2)
NEUTROPHILS NFR BLD: 87 % (ref 36–65)
NEUTS SEG NFR BLD: 15.66 K/UL (ref 1.5–8.1)
NRBC BLD-RTO: 0 PER 100 WBC
O2 SAT, ARTERIAL: 94.6 % (ref 95–98)
PCO2 ARTERIAL: 36.9 MMHG (ref 35–45)
PH ARTERIAL: 7.4 (ref 7.35–7.45)
PLATELET # BLD AUTO: 217 K/UL (ref 138–453)
PMV BLD AUTO: 10 FL (ref 8.1–13.5)
PO2 ARTERIAL: 71.7 MMHG (ref 80–100)
POTASSIUM SERPL-SCNC: 4.9 MMOL/L (ref 3.7–5.3)
PROT SERPL-MCNC: 6.9 G/DL (ref 6.4–8.3)
RBC # BLD AUTO: 6.98 M/UL (ref 3.95–5.11)
SODIUM SERPL-SCNC: 135 MMOL/L (ref 135–144)
TROPONIN I SERPL HS-MCNC: 26 NG/L (ref 0–14)
WBC OTHER # BLD: 18 K/UL (ref 3.5–11.3)

## 2024-02-06 PROCEDURE — 82805 BLOOD GASES W/O2 SATURATION: CPT

## 2024-02-06 PROCEDURE — 86140 C-REACTIVE PROTEIN: CPT

## 2024-02-06 PROCEDURE — 2500000003 HC RX 250 WO HCPCS

## 2024-02-06 PROCEDURE — 2000000000 HC ICU R&B

## 2024-02-06 PROCEDURE — 71250 CT THORAX DX C-: CPT

## 2024-02-06 PROCEDURE — 85025 COMPLETE CBC W/AUTO DIFF WBC: CPT

## 2024-02-06 PROCEDURE — 6370000000 HC RX 637 (ALT 250 FOR IP)

## 2024-02-06 PROCEDURE — 99232 SBSQ HOSP IP/OBS MODERATE 35: CPT | Performed by: PHYSICIAN ASSISTANT

## 2024-02-06 PROCEDURE — 36415 COLL VENOUS BLD VENIPUNCTURE: CPT

## 2024-02-06 PROCEDURE — 6370000000 HC RX 637 (ALT 250 FOR IP): Performed by: INTERNAL MEDICINE

## 2024-02-06 PROCEDURE — 36600 WITHDRAWAL OF ARTERIAL BLOOD: CPT

## 2024-02-06 PROCEDURE — 84484 ASSAY OF TROPONIN QUANT: CPT

## 2024-02-06 PROCEDURE — 80053 COMPREHEN METABOLIC PANEL: CPT

## 2024-02-06 PROCEDURE — 93005 ELECTROCARDIOGRAM TRACING: CPT

## 2024-02-06 PROCEDURE — 2580000003 HC RX 258

## 2024-02-06 PROCEDURE — 2700000000 HC OXYGEN THERAPY PER DAY

## 2024-02-06 PROCEDURE — 6360000002 HC RX W HCPCS

## 2024-02-06 PROCEDURE — 83880 ASSAY OF NATRIURETIC PEPTIDE: CPT

## 2024-02-06 PROCEDURE — 94761 N-INVAS EAR/PLS OXIMETRY MLT: CPT

## 2024-02-06 PROCEDURE — 94640 AIRWAY INHALATION TREATMENT: CPT

## 2024-02-06 PROCEDURE — 99254 IP/OBS CNSLTJ NEW/EST MOD 60: CPT | Performed by: INTERNAL MEDICINE

## 2024-02-06 RX ORDER — LEVALBUTEROL INHALATION SOLUTION 0.63 MG/3ML
0.63 SOLUTION RESPIRATORY (INHALATION) EVERY 8 HOURS PRN
Status: DISCONTINUED | OUTPATIENT
Start: 2024-02-06 | End: 2024-02-07 | Stop reason: HOSPADM

## 2024-02-06 RX ADMIN — APIXABAN 5 MG: 5 TABLET, FILM COATED ORAL at 20:25

## 2024-02-06 RX ADMIN — METOPROLOL TARTRATE 50 MG: 50 TABLET, FILM COATED ORAL at 20:24

## 2024-02-06 RX ADMIN — ASPIRIN 81 MG: 81 TABLET, CHEWABLE ORAL at 08:03

## 2024-02-06 RX ADMIN — METFORMIN HYDROCHLORIDE 500 MG: 500 TABLET ORAL at 17:07

## 2024-02-06 RX ADMIN — FAMOTIDINE 20 MG: 20 TABLET, FILM COATED ORAL at 08:03

## 2024-02-06 RX ADMIN — COLCHICINE 0.6 MG: 0.6 TABLET ORAL at 08:03

## 2024-02-06 RX ADMIN — GUAIFENESIN 600 MG: 600 TABLET, EXTENDED RELEASE ORAL at 20:25

## 2024-02-06 RX ADMIN — GUAIFENESIN 600 MG: 600 TABLET, EXTENDED RELEASE ORAL at 08:03

## 2024-02-06 RX ADMIN — APIXABAN 5 MG: 5 TABLET, FILM COATED ORAL at 08:03

## 2024-02-06 RX ADMIN — SODIUM CHLORIDE, PRESERVATIVE FREE 10 ML: 5 INJECTION INTRAVENOUS at 20:25

## 2024-02-06 RX ADMIN — ATORVASTATIN CALCIUM 40 MG: 40 TABLET, FILM COATED ORAL at 20:24

## 2024-02-06 RX ADMIN — AZITHROMYCIN MONOHYDRATE 500 MG: 500 INJECTION, POWDER, LYOPHILIZED, FOR SOLUTION INTRAVENOUS at 12:44

## 2024-02-06 RX ADMIN — PREDNISONE 40 MG: 20 TABLET ORAL at 11:37

## 2024-02-06 RX ADMIN — METOPROLOL TARTRATE 50 MG: 50 TABLET, FILM COATED ORAL at 08:03

## 2024-02-06 RX ADMIN — METFORMIN HYDROCHLORIDE 500 MG: 500 TABLET ORAL at 08:03

## 2024-02-06 RX ADMIN — Medication 0.5 MG/MIN: at 12:36

## 2024-02-06 RX ADMIN — FAMOTIDINE 20 MG: 20 TABLET, FILM COATED ORAL at 20:24

## 2024-02-06 RX ADMIN — MOMETASONE FUROATE AND FORMOTEROL FUMARATE DIHYDRATE 2 PUFF: 200; 5 AEROSOL RESPIRATORY (INHALATION) at 20:39

## 2024-02-06 RX ADMIN — SODIUM CHLORIDE, PRESERVATIVE FREE 10 ML: 5 INJECTION INTRAVENOUS at 08:06

## 2024-02-06 RX ADMIN — CEFTRIAXONE SODIUM 1000 MG: 1 INJECTION, POWDER, FOR SOLUTION INTRAMUSCULAR; INTRAVENOUS at 11:37

## 2024-02-06 ASSESSMENT — PAIN SCALES - GENERAL: PAINLEVEL_OUTOF10: 0

## 2024-02-06 NOTE — CONSULTS
I, Pebbles Reagan am scribing for and in the presence of Drake Davis MD, F.A.C.C..    Patient: Sully Thomas  : 1965  Date of Admission: 2024  Primary Care Physician: Lionel Covarrubias  Today's Date: 2024    REASON FOR CONSULTATION/CC: shortness of breath    HPI: I had the pleasure of seeing Ms. Thomas today who is a 58 y.o. female with a history of intermittent chest discomfort leading to this consultation.     She denies any family history of heart disease.   She is current smoker.    Echo done on 2024- EF 60-65%  Left ventricle size is normal. Mildly increased wall thickness. Normal wall motion. Grade I diastolic dysfunction with normal LAP.  Tricuspid Valve: Mild regurgitation. The estimated RVSP is 38 mmHg.     Ms. Thomas  was admitted for shortness of breath on . She states she had a pain that started on Saturday night. She states the pain hurt with breathing. She denies any cough, fever or chills. She sleeps in a recliner due to more comfortable not due to breathing problems.  She reported that prior to her admission she was unable to lay back on her recliner because it caused the chest pain to get worse.  No history of upper respiratory infection.  No fever or chills.  Recently diagnosed with diabetes, hemoglobin A1c 6.7%.  She reported having family history of hypertension and diabetes.  She denied any history of obstructive sleep apnea syndrome.  She is morbidly obese and is trying to lose weight. 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     She denied any current  abdominal pain, bleeding problems, problems with her medications or any other concerns at this time.    Reviewed her EKGs, clinical picture along with the EKG changes are highly suggestive of pericarditis.  This can be idiopathic.  I told her we will start her on colchicine and we will trend troponin and repeat EKG tomorrow 
home O2 evaluation at the time of discharge.  She will need outpatient pulmonary function test.  She will need outpatient sleep study.    She will need follow-up hemoglobin hematocrit in 4 weeks per medicine/primary service and if still elevated then would recommend hematology evaluation    Discussed with hospitalist service.  Discussed with nursing staff.  I updated the patient regarding the current clinical condition, provisional diagnosis and management plan. She verbalized a clear understanding and I addressed her concerns, and answered all questions to the best of my abilities.    It was my pleasure to evaluate Sully Thomas today. We will continue to follow. I would like to thank you for allowing me to participate in the care of this patient.  Please feel free to call with any further questions or concerns.    Jac Buckley MD, MCYNDY.   Pulmonary and Critical Care Medicine           2/6/2024, 3:03 PM    Please note that this chart was generated using voice recognition Dragon dictation software.  Although every effort was made to ensure the accuracy of this automated transcription, some errors in transcription may have occurred.

## 2024-02-07 ENCOUNTER — APPOINTMENT (OUTPATIENT)
Age: 59
DRG: 133 | End: 2024-02-07
Payer: MEDICAID

## 2024-02-07 VITALS
TEMPERATURE: 96.9 F | HEART RATE: 77 BPM | OXYGEN SATURATION: 97 % | SYSTOLIC BLOOD PRESSURE: 134 MMHG | HEIGHT: 66 IN | RESPIRATION RATE: 18 BRPM | BODY MASS INDEX: 34.19 KG/M2 | WEIGHT: 212.74 LBS | DIASTOLIC BLOOD PRESSURE: 76 MMHG

## 2024-02-07 LAB
ALBUMIN SERPL-MCNC: 3.2 G/DL (ref 3.5–5.2)
ALBUMIN/GLOB SERPL: 0.8 {RATIO} (ref 1–2.5)
ALP SERPL-CCNC: 62 U/L (ref 35–104)
ALT SERPL-CCNC: 19 U/L (ref 5–33)
ANION GAP SERPL CALCULATED.3IONS-SCNC: 10 MMOL/L (ref 9–17)
AST SERPL-CCNC: 10 U/L
BASOPHILS # BLD: 0.06 K/UL (ref 0–0.2)
BASOPHILS NFR BLD: 0 % (ref 0–2)
BILIRUB SERPL-MCNC: 0.2 MG/DL (ref 0.3–1.2)
BUN SERPL-MCNC: 21 MG/DL (ref 6–20)
BUN/CREAT SERPL: 35 (ref 9–20)
CALCIUM SERPL-MCNC: 9 MG/DL (ref 8.6–10.4)
CHLORIDE SERPL-SCNC: 99 MMOL/L (ref 98–107)
CO2 SERPL-SCNC: 27 MMOL/L (ref 20–31)
CREAT SERPL-MCNC: 0.6 MG/DL (ref 0.5–0.9)
CRP SERPL HS-MCNC: 35.7 MG/L (ref 0–5)
ECHO BSA: 2.11 M2
EKG ATRIAL RATE: 66 BPM
EKG ATRIAL RATE: 76 BPM
EKG P AXIS: 71 DEGREES
EKG P-R INTERVAL: 118 MS
EKG Q-T INTERVAL: 360 MS
EKG Q-T INTERVAL: 374 MS
EKG QRS DURATION: 80 MS
EKG QRS DURATION: 82 MS
EKG QTC CALCULATION (BAZETT): 420 MS
EKG QTC CALCULATION (BAZETT): 428 MS
EKG R AXIS: 54 DEGREES
EKG R AXIS: 60 DEGREES
EKG T AXIS: 30 DEGREES
EKG T AXIS: 41 DEGREES
EKG VENTRICULAR RATE: 76 BPM
EKG VENTRICULAR RATE: 85 BPM
EOSINOPHIL # BLD: <0.03 K/UL (ref 0–0.44)
EOSINOPHILS RELATIVE PERCENT: 0 % (ref 1–4)
ERYTHROCYTE [DISTWIDTH] IN BLOOD BY AUTOMATED COUNT: 19.6 % (ref 11.8–14.4)
GFR SERPL CREATININE-BSD FRML MDRD: >60 ML/MIN/1.73M2
GLUCOSE SERPL-MCNC: 89 MG/DL (ref 70–99)
HCT VFR BLD AUTO: 61.2 % (ref 36.3–47.1)
HGB BLD-MCNC: 18.9 G/DL (ref 11.9–15.1)
IMM GRANULOCYTES # BLD AUTO: 0.06 K/UL (ref 0–0.3)
IMM GRANULOCYTES NFR BLD: 0 %
LYMPHOCYTES NFR BLD: 3.41 K/UL (ref 1.1–3.7)
LYMPHOCYTES RELATIVE PERCENT: 25 % (ref 24–43)
MCH RBC QN AUTO: 26 PG (ref 25.2–33.5)
MCHC RBC AUTO-ENTMCNC: 30.9 G/DL (ref 28.4–34.8)
MCV RBC AUTO: 84.2 FL (ref 82.6–102.9)
MONOCYTES NFR BLD: 1.24 K/UL (ref 0.1–1.2)
MONOCYTES NFR BLD: 9 % (ref 3–12)
NEUTROPHILS NFR BLD: 65 % (ref 36–65)
NEUTS SEG NFR BLD: 8.63 K/UL (ref 1.5–8.1)
NRBC BLD-RTO: 0 PER 100 WBC
PLATELET # BLD AUTO: 215 K/UL (ref 138–453)
PMV BLD AUTO: 9.7 FL (ref 8.1–13.5)
POTASSIUM SERPL-SCNC: 4.8 MMOL/L (ref 3.7–5.3)
PROT SERPL-MCNC: 7.1 G/DL (ref 6.4–8.3)
RBC # BLD AUTO: 7.27 M/UL (ref 3.95–5.11)
SODIUM SERPL-SCNC: 136 MMOL/L (ref 135–144)
WBC OTHER # BLD: 13.4 K/UL (ref 3.5–11.3)

## 2024-02-07 PROCEDURE — 80053 COMPREHEN METABOLIC PANEL: CPT

## 2024-02-07 PROCEDURE — 93242 EXT ECG>48HR<7D RECORDING: CPT

## 2024-02-07 PROCEDURE — 94640 AIRWAY INHALATION TREATMENT: CPT

## 2024-02-07 PROCEDURE — 93010 ELECTROCARDIOGRAM REPORT: CPT | Performed by: FAMILY MEDICINE

## 2024-02-07 PROCEDURE — 6370000000 HC RX 637 (ALT 250 FOR IP)

## 2024-02-07 PROCEDURE — 6370000000 HC RX 637 (ALT 250 FOR IP): Performed by: NURSE PRACTITIONER

## 2024-02-07 PROCEDURE — 94761 N-INVAS EAR/PLS OXIMETRY MLT: CPT

## 2024-02-07 PROCEDURE — 36415 COLL VENOUS BLD VENIPUNCTURE: CPT

## 2024-02-07 PROCEDURE — 6360000002 HC RX W HCPCS

## 2024-02-07 PROCEDURE — 85025 COMPLETE CBC W/AUTO DIFF WBC: CPT

## 2024-02-07 PROCEDURE — 86140 C-REACTIVE PROTEIN: CPT

## 2024-02-07 PROCEDURE — 99232 SBSQ HOSP IP/OBS MODERATE 35: CPT | Performed by: PHYSICIAN ASSISTANT

## 2024-02-07 PROCEDURE — 2580000003 HC RX 258

## 2024-02-07 PROCEDURE — 93005 ELECTROCARDIOGRAM TRACING: CPT | Performed by: INTERNAL MEDICINE

## 2024-02-07 RX ORDER — ASPIRIN 81 MG/1
81 TABLET, CHEWABLE ORAL DAILY
Qty: 30 TABLET | Refills: 3 | Status: SHIPPED | OUTPATIENT
Start: 2024-02-08

## 2024-02-07 RX ORDER — METOPROLOL TARTRATE 50 MG/1
50 TABLET, FILM COATED ORAL 2 TIMES DAILY
Qty: 60 TABLET | Refills: 3 | Status: SHIPPED | OUTPATIENT
Start: 2024-02-07

## 2024-02-07 RX ORDER — AMIODARONE HYDROCHLORIDE 200 MG/1
200 TABLET ORAL DAILY
Qty: 30 TABLET | Refills: 3 | Status: SHIPPED | OUTPATIENT
Start: 2024-02-07

## 2024-02-07 RX ORDER — AMIODARONE HYDROCHLORIDE 200 MG/1
200 TABLET ORAL DAILY
Status: DISCONTINUED | OUTPATIENT
Start: 2024-02-07 | End: 2024-02-07 | Stop reason: HOSPADM

## 2024-02-07 RX ORDER — PREDNISONE 20 MG/1
40 TABLET ORAL DAILY
Qty: 2 TABLET | Refills: 0 | Status: SHIPPED | OUTPATIENT
Start: 2024-02-08 | End: 2024-02-09

## 2024-02-07 RX ORDER — DOXYCYCLINE HYCLATE 100 MG
100 TABLET ORAL 2 TIMES DAILY
Qty: 10 TABLET | Refills: 0 | Status: SHIPPED | OUTPATIENT
Start: 2024-02-07 | End: 2024-02-12

## 2024-02-07 RX ADMIN — APIXABAN 5 MG: 5 TABLET, FILM COATED ORAL at 08:55

## 2024-02-07 RX ADMIN — FAMOTIDINE 20 MG: 20 TABLET, FILM COATED ORAL at 08:55

## 2024-02-07 RX ADMIN — COLCHICINE 0.6 MG: 0.6 TABLET ORAL at 08:55

## 2024-02-07 RX ADMIN — METFORMIN HYDROCHLORIDE 500 MG: 500 TABLET ORAL at 07:08

## 2024-02-07 RX ADMIN — AMIODARONE HYDROCHLORIDE 200 MG: 200 TABLET ORAL at 11:17

## 2024-02-07 RX ADMIN — METOPROLOL TARTRATE 50 MG: 50 TABLET, FILM COATED ORAL at 08:52

## 2024-02-07 RX ADMIN — SODIUM CHLORIDE, PRESERVATIVE FREE 10 ML: 5 INJECTION INTRAVENOUS at 08:48

## 2024-02-07 RX ADMIN — ASPIRIN 81 MG: 81 TABLET, CHEWABLE ORAL at 08:55

## 2024-02-07 RX ADMIN — MOMETASONE FUROATE AND FORMOTEROL FUMARATE DIHYDRATE 2 PUFF: 200; 5 AEROSOL RESPIRATORY (INHALATION) at 09:38

## 2024-02-07 RX ADMIN — CEFTRIAXONE SODIUM 1000 MG: 1 INJECTION, POWDER, FOR SOLUTION INTRAMUSCULAR; INTRAVENOUS at 11:10

## 2024-02-07 RX ADMIN — PREDNISONE 40 MG: 20 TABLET ORAL at 08:48

## 2024-02-07 NOTE — PROGRESS NOTES
Assessment and vitals obtained at this time as charted. Pt is A&O x4 and denies pain. Pt is 93% on room air. Pt is resting in bed watching TV and denies any further needs at this time. Call light within reach, care ongoing.   
Comprehensive Nutrition Assessment    Type and Reason for Visit:  Initial    Nutrition Recommendations/Plan:   Continue current diet .      Malnutrition Assessment:  Malnutrition Status:  At risk for malnutrition (Comment) (02/05/24 1302)    Context:  Acute Illness     Findings of the 6 clinical characteristics of malnutrition:  Energy Intake:  No significant decrease in energy intake  Weight Loss:  7.5% over 3 months (17% with fluid losses)     Body Fat Loss:  No significant body fat loss     Muscle Mass Loss:  No significant muscle mass loss    Fluid Accumulation:  No significant fluid accumulation     Strength:  Not Performed    Nutrition Assessment:    Altered nutrition related labs r/t edocrine dysfunction aeb glucose 150, on steroid therapy. Recommend addition of probiotic due to ATB therapy. Pt reports of good PO. Had weight loss due to diuretic therapy when her leg started swelling, lost 42#, intended loss.  is on dialysis, Pt very aware of low sodium diet, denied any diet education needs at this time.    Nutrition Related Findings:    appears well nourished Wound Type: None       Current Nutrition Intake & Therapies:    Average Meal Intake: %  Average Supplements Intake: None Ordered  ADULT DIET; Regular; Low Sodium (2 gm)    Anthropometric Measures:  Height: 167.6 cm (5' 5.98\")  Ideal Body Weight (IBW): 130 lbs (59 kg)    Admission Body Weight: 95.3 kg (210 lb 2 oz)  Current Body Weight: 95.3 kg (210 lb 1.6 oz), 161.6 % IBW. Weight Source: Bed Scale  Current BMI (kg/m2): 33.9        Weight Adjustment For: No Adjustment                 BMI Categories: Obese Class 1 (BMI 30.0-34.9)    Estimated Daily Nutrient Needs:  Energy Requirements Based On: Kcal/kg  Weight Used for Energy Requirements: Current  Energy (kcal/day): 1559-0173 (15-18/kg)  Weight Used for Protein Requirements: Ideal  Protein (g/day): 77-89g (1.3-1.5g/kg)  Method Used for Fluid Requirements: 1 ml/kcal  Fluid (ml/day): 1,800 
Drinks and food removed from side table at 0000. Pt reminded of npo status and verbalized understanding.   
Entered patient's room for late morning head to toe assessment. Patient resting in the chair at this time. A&O x4, calm, and cooperative. Patient denies of pain at this time. No complaints of chest pain or pressure. Head to toe assessment completed at this time, see flowsheets for more details. IV antibiotics started at this time. Patient denies no more needs at this time. Call light within reach. Bed/chair wheels locked. Bed in lowest position.    
Facilitated video visit between patient and Dr. Buckley.   
Occupational Therapy  [facility] [place]  Inpatient/Observation/Outpatient Rehabilitation    Date: 2024  Patient Name: Sully Thomas       [x] Inpatient Acute/Observation       []  Outpatient  : 1965       [] Pt refused/declined therapy at this time due to:  Attempted to see patient for OT eval this date. Pt seated EOB upon arrival. Writer educ patient on purpose of OT. Pt reports she is at her baseline, and is indep in all func mob and ADLs/IADLs. Pt has no questions or concerns regarding returning home and to PLOF. Pt denies skilled OT needs at this time. Writer educ patient on general home safety and to notify nursing or physician if demand in ADL/IADL status changes. Pt verbalized understanding and agreement. OT complete with pt seated EOB, call light and all needs met.           Kandi Miller, OTR/L Date: 2024   
Patient noted to be tachycardic on telemetry-150s/160s. Writer entered patients room and patient is in the bathroom. Patient states that she has just had a bowel movement and then returned to her bed. Patient appears asymptomatic and states that she feels fine. Patient denies chest pain, lightheaded or dizziness. Writer completed EKG and vitals and notified Kristyn her primary nurse along with RAJEEV Giles.  
Patient scheduled to have a cardioversion in cath lab tomorrow (2/7) @ 1100. Silvia in IR has been made aware as well as Dr. Davis.  
Patient transferred back to ICU at this time per Dr. Davis. Report given to KRAEN Burgos.   
Patient's oxygen on 3L has remained 90-95% while awake and she dipped down to 88% 91% while sleeping. Patient denies SOB while ambulating. Call light and bedside table remain within reach. Care ongoing.    
Physical Therapy  Facility/Department: [facility] ICU  Physical Therapy Initial Assessment    Name: Sully Thomas  : 1965  MRN: [id number]  Date of Service: 2024    Discharge Recommendations:  Home independently      Patient Diagnosis(es): The primary encounter diagnosis was Acute respiratory failure with hypoxia and hypercapnia (HCC). A diagnosis of COPD exacerbation (HCC) was also pertinent to this visit.  Past Medical History:  has a past medical history of Acute idiopathic pericarditis and Type 2 diabetes mellitus without complication, without long-term current use of insulin (HCC).  Past Surgical History:  has a past surgical history that includes Skin graft (Left, ); Carpal tunnel release (Left); Carpal tunnel release (Right); Knee arthroscopy (Right); Rotator cuff repair (Left, 2017); Bladder surgery; Cervical spine surgery; and Lumbar disc surgery.    Assessment   Assessment: Pt was referred for difficulty walking. Pt is physically independent and has issues with maintaining pulseO2. Pt has no concerns and is not in need for skilled therapy.  Treatment Diagnosis: difficulty walking  Therapy Prognosis: Good  Decision Making: Medium Complexity  Barriers to Learning: none  No Skilled PT: Independent with functional mobility   Requires PT Follow-Up: No  Activity Tolerance  Activity Tolerance: Patient tolerated evaluation without incident     Plan   Physical Therapy Plan  General Plan: Discharge with evaluation only  Safety Devices  Type of Devices: Left in chair, Chair alarm in place, Call light within reach, Patient at risk for falls     Restrictions  Restrictions/Precautions  Restrictions/Precautions: Fall Risk, General Precautions     Subjective   General  Chart Reviewed: Yes  Follows Commands: Within Functional Limits  Subjective  Subjective: Pt states she wants to go home but the doctor said she had to stay         Social/Functional History  Social/Functional History  Lives With: Significant 
Pt transferred from [facility] to  to be on Amiodarone gtt for new onset A-Fib. Pt arrived via bed. Pt hooked up to ICU monitor/wires. Assessment and vital signs as charted. Pt denies pain at this time. Pt is A & O x 4. Writer reviewed POC with pt. Pt denies any other needs. Call light in reach. Will continue to monitor.   
Pt up to the bathroom and back to bed. Assessment and vital signs as charted. Pt denies pain and is A & O x 4. Cardiac monitor continues to show A-Fib. IV medication infusing as ordered. Nasal canula on at 2 L. Continuous pulse ox remains WNL. Pt denies any other needs. Call light in reach. Will continue to monitor.   
Pt uses call light and requests to use bathroom. SBA to toilet without loss of balance. VS and assessment complete. Pt denies any sob or pain at this time. A/O x4 and uses call light for assistance. POC discusses and reminded of npo status at 0000. Verbalized understanding. Call light in reach.   
RESPIRATORY ASSESSMENT PROTOCOL                                                                                              Patient Name: Sully Thomas Room#: 0327/0327-01 : 1965     Admitting diagnosis: COPD exacerbation (HCC) [J44.1]  Acute respiratory failure with hypoxia and hypercapnia (HCC) [J96.01, J96.02]  Acute hypoxic respiratory failure (HCC) [J96.01]       Medical History: History reviewed. No pertinent past medical history.    PATIENT ASSESSMENT    LABORATORY DATA  Hematology:   Lab Results   Component Value Date/Time    WBC 13.4 2024 08:35 AM    RBC 7.50 2024 08:35 AM    HGB 19.8 2024 08:35 AM    HCT 64.0 2024 08:35 AM     2024 08:35 AM     Chemistry:    Lab Results   Component Value Date/Time    PHART 7.236 2024 08:55 AM    IMR6NUM 68.1 2024 08:55 AM    PO2ART 71.1 2024 08:55 AM    A4YPIETF 90.8 2024 08:55 AM    ZEI4MBJ 28.3 2024 08:55 AM    NBEA 1.0 2024 08:55 AM       VITALS  Pulse: 92   Respirations: 14  BP: 120/79  SpO2: 90 % O2 Device: Nasal cannula  Temp: 98.2 °F (36.8 °C)    SKIN COLOR  [x] Normal  [] Pale  [] Dusky  [] Cyanotic    RESPIRATORY PATTERN  [x] Normal  [] Dyspnea  [] Cheyne-Schilling  [] Kussmaul  [] Biots    AMBULATORY  [] Yes  [] No  [x] With Assistance    PEAK FLOW  Predicted:     Personal Best:            Patient Acuity 0 1 2 3 4 Score   Level of Consciousness (LOC) [x]  Alert & Oriented or Pt normal LOC []  Confused;follows directions []  Confused & uncooper-ative []  Obtunded []  Comatose 0   Respiratory Rate  (RR) [x]  Reg. rate & pattern. 12 - 20 bpm  []  Increased RR. Greater than 20 bpm   []  SOB w/ exertion or RR greater than 24 bpm []  Access- ory muscle use at rest. Abn.  resp. []  SOB at rest.   0   Bilateral Breath Sounds (BBS) []  Clear []  Diminish-ed bases  []  Diminish-ed t/o, or rales   [x]  Sporadic, scattered wheezes or rhonchi []  Persistentwheezes and, or absent BBS 3   Cough 
Spiritual Services Interventions  [id number]   2/5/2024  Candice Heath    Sully Thomas  58 y.o. 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     
Vitals and assessment are complete at this time. Writer walked patient through the medications that would be administered tonight and encouraged patient to ask questions about the medications and therapies. Patient denies questions.   Dr. Daniel spoke with patient at shift change and patient is aware that she will have a cardiology consult and echo in the morning.  Call light is within reach and bed alarm set. Patient denies needs at this time. Care ongoing.    
Writer birgit patient's lab work and sent it down. Patient denies needs or questions at this time.   
Writer to bedside to complete morning assessment. Upon entry to room, pt resting in bed, respirations even  while on 3L  nasal cannula. Vitals obtained and assessment completed, see flow sheet for details. Pt denies needs from writer at this time. Call light in reach. Care ongoing.    
cancelled respiratory treatments per Leidy Chand  
IntraVENous Q24H    guaiFENesin  600 mg Oral BID    famotidine  20 mg Oral BID     Continuous Infusions:   sodium chloride       PRN Meds:   levalbuterol, 0.63 mg, Q8H PRN  sodium chloride flush, 5-40 mL, PRN  sodium chloride, , PRN  ondansetron, 4 mg, Q8H PRN   Or  ondansetron, 4 mg, Q6H PRN  polyethylene glycol, 17 g, Daily PRN  acetaminophen, 650 mg, Q6H PRN   Or  acetaminophen, 650 mg, Q6H PRN  guaiFENesin-dextromethorphan, 5 mL, Q4H PRN  benzonatate, 100 mg, TID PRN            VASOPRESSORS:    [x] No      [] Yes  [] Levophed      [] Dopamine     [] Vasopressin      [] Dobutamine     [] Phenylephrine     [] Epinephrine        DATA:  Complete Blood Count:   Recent Labs     02/05/24  0500 02/06/24  0515 02/07/24  0510   WBC 19.8* 18.0* 13.4*   RBC 7.05* 6.98* 7.27*   HGB 18.4* 18.3* 18.9*   HCT 59.8* 58.7* 61.2*   MCV 84.8 84.1 84.2   RDW 18.8* 19.4* 19.6*    217 215   NEUTROABS 17.52* 15.66* 8.63*   LYMPHOPCT 5* 7* 25   LYMPHSABS 0.94* 1.16 3.41   MONOPCT 6 6 9   EOSRELPCT 0* 0* 0*   BASOPCT 0 0 0   IMMGRAN 1* 0 0        Recent Blood Glucose:   Recent Labs     02/04/24  0835 02/05/24  0500 02/06/24  0515 02/07/24  0510   GLUCOSE 143* 150* 161* 89        Comprehensive Metabolic Profile:   Recent Labs     02/05/24  0500 02/06/24  0515 02/07/24  0510   BUN 14 18 21*   CREATININE 0.7 0.6 0.6   * 135 136   K 5.1 4.9 4.8   CL 94* 97* 99   CALCIUM 9.4 8.8 9.0   ANIONGAP 18* 12 10   CO2 22 26 27   PROT 7.6 6.9 7.1   LABALBU 3.6 3.2* 3.2*   BILITOT 0.3 0.2* 0.2*   ALKPHOS 78 64 62   AST 19 9 10   ALT 11 11 19        HgBA1c:    Lab Results   Component Value Date/Time    LABA1C 6.7 02/05/2024 05:00 AM       TSH:    Lab Results   Component Value Date/Time    TSH 0.37 02/05/2024 05:00 AM       Lactic Acid:   Lab Results   Component Value Date/Time    LACTA 1.1 02/04/2024 08:30 AM        High Sensitivity Troponin:  Recent Labs     02/04/24  0957 02/05/24  0500 02/06/24  0515   TROPHS 19* 17* 26*     Pro-BNP:  Lab 
cancer screening, adhere to Lung-RADS guidelines.   Reference: Radiology. 2017; 284(1):228-43.                  ASSESSMENT / PLAN:    MEDICAL DECISION MAKING:    Primary Problem(s): Acute hypoxic respiratory failure (HCC)  Differential diagnoses: ACS, COPD exacerbation, viral illness  Condition is an undiagnosed new problem with uncertain prognosis  Condition is stable  Treatment plan:   Wean oxygen  Repeat EKG and troponin  Imaging: Echo ordered  Medications:   Continue Solu-Medrol  Continue nebs  Medication Monitoring / High Risk Medications: none      COPD    Condition is a chronic stable condition  Treatment plan:   Wean oxygen  Imaging: no further imaging studies ordered today  Medications:    Continue Rocephin/Zithromax  Continue Mucinex  Continue nebs  Continue Solu-Medrol    EKG changes/Elevated Troponin    Condition is unchanged  Treatment plan:   Appreciate cardiology  Reviewed with Dr. Susan Rowe troponins  Repeat EKG  Lipid panel  A1c today  TSH, T4  Imaging: Echo ordered  Medications:   Start aspirin  Give 325 mg x 1 dose now  Start Lipitor    Nutrition status:   obesity, non-morbid  Dietician consult initiated    Hospital Prophylaxis:   DVT: Lovenox   Stress Ulcer: H2 Blocker     Disposition:  Shared decision making: All test results, treatment options and disposition options were discussed with the patient today  Social determinants of health that may impact management: none  Code status: Full Code   Disposition: Discharge plan is pending    [facility] Advanced Care Planning documentation:  [x] I have confirmed that the patient's Advance Care Plan is present, Code Status is documented, or surrogate decision maker is listed in the patient's medical record  [If \"yes\", STOP HERE]     [] The patient's Advance Care Plan is NOT present because:    []  I confirmed today that the patient does not wish or was not able to name a   surrogate decision maker or provide and advance care plan.    [] Hospice care is 
above  Medications:   IV amiodarone  Started on Eliquis      Type 2 diabetes  Condition is stable  Treatment plan:   Monitor labs  A1c-6.7  Imaging: no further imaging studies ordered today  Medications:   Continue Glucophage    Nutrition status:   morbid obesity  Dietician consult initiated    Hospital Prophylaxis:   DVT: Eliquis   Stress Ulcer: H2 Blocker     Disposition:  Shared decision making: All test results, treatment options and disposition options were discussed with the patient today  Social determinants of health that may impact management: none  Code status: Full Code   Disposition: Discharge plan is pending        Critical Care Time:  Total critical care time caring for this patient with life threatening, unstable organ failure, including direct patient contact, management of life support systems, review of data including imaging and labs, discussions with other team members and physicians at least 0 minutes so far today, excluding separately billable procedures.    [facility] Advanced Care Planning documentation:  [x] I have confirmed that the patient's Advance Care Plan is present, Code Status is documented, or surrogate decision maker is listed in the patient's medical record  [If \"yes\", STOP HERE]     [] The patient's Advance Care Plan is NOT present because:    []  I confirmed today that the patient does not wish or was not able to name a   surrogate decision maker or provide and advance care plan.    [] Hospice care is currently being provided or has been provided within the   calendar year.    []  I did NOT confirm today the presence of an Advance Care Plan or surrogate   decision maker documented within the patient's medical record.   [DOES NOT SATISFY [facility] PERFORMANCE]      Leidy Pelletier, AIMEE - CNP , APRN-NP-C  2/6/2024  6:19 AM      
patient problem list and past encounter diagnosis.   Stroke Risk: CHADS2-VASc Score: 2/9 (2.2% stroke risk)  Anticoagulation: Continue Apixaban (Eliquis) 5 mg every 12 hours.  Additional Testing List: I ordered an EVENT MONITOR to try and pinpoint the etiology of their symptoms    Multiple risk factors for CAD including diabetes, hypertension and history of smoking.  No family history of premature CAD.  She reported having family history of diabetes and hypertension.  I agree with aspirin and Lipitor therapy.    Acute hypoxemic/hypercarbic respiratory failure.  History suggestive of obstructive sleep apnea syndrome/chronic lung disease.  Patient does have leukocytosis and thrombocytosis.  Currently on nasal canula oxygen.  Reviewed the chest CT as above.  Currently treated for community-acquired pneumonia.  She will need sleep study as an outpatient.    Tobacco Abuse Counseling: I spent several minutes discussing the dangers of tobacco abuse as well as multiple methods for trying to quit smoking. In the end, Ms. Thomas said that she did not want any assistance at this time but would continue to try and quit reduce and eventually quit smoking in the near future.    Hyperlipidemia: Mixed  Cholesterol Reduction Therapy: Continue Atorvastatin (Lipitor) 40 mg daily.      Type II diabetes mellitus  A1C 6.7  Continue Metformin.    Obesity: Body mass index is 34.35 kg/m².   I also briefly discussed both diet and exercise strategies for her to continue to loses weight and she was very receptive to this.    Suspected Obstructive Sleep Apnea: We will consider outpatient sleep study.  Pulmonary consult also called because of pulmonary nodules, acute hypoxemic/hypercarbic respiratory failure and secondary thrombocytosis  Evaluated by pulmonology- Recommended CT scan in 3 months, O2 evaluation, PFT, sleep study, and follow up H&H in 4 weeks.    Once again, thank you for allowing me to participate in this patients care. Please do 
did not want any assistance at this time but would continue to try and quit reduce and eventually quit smoking in the near future.    Hyperlipidemia: Mixed  Cholesterol Reduction Therapy: Continue Atorvastatin (Lipitor) 40 mg daily.      Type II diabetes mellitus  A1C 6.7  Continue Metformin.    Obesity: Body mass index is 33.96 kg/m².   I also briefly discussed both diet and exercise strategies for her to continue to loses weight and she was very receptive to this.    Suspected Obstructive Sleep Apnea: We will consider outpatient sleep study.  Pulmonary consult also called because of pulmonary nodules, acute hypoxemic/hypercarbic respiratory failure and secondary thrombocytosis  Evaluated by pulmonology- Recommended CT scan in 3 months, O2 evaluation, PFT, sleep study, and follow up H&H in 4 weeks.    Once again, thank you for allowing me to participate in this patients care. Please do not hesitate to contact me could I be of further assistance. I discussed patient's symptoms and treatment plan with Dr Davis, he was in agreement with the plan and follow up.      Sincerely,  Daisy Dalal PA-C  [facility] Cardiology Specialist    [address] [place] [address]  Phone: [phone], Fax: [phone]     I believe that the risk of significant morbidity and mortality related to the patient's current medical conditions are: intermediate-high.        February 6, 2024

## 2024-02-07 NOTE — DISCHARGE INSTR - DIET
Good nutrition is important when healing from an illness, injury, or surgery.  Follow any nutrition recommendations given to you during your hospital stay.   If you were given an oral nutrition supplement while in the hospital, continue to take this supplement at home.  You can take it with meals, in-between meals, and/or before bedtime. These supplements can be purchased at most local grocery stores, pharmacies, and chain [employer]-stores.   If you have any questions about your diet or nutrition, call the hospital and ask for the dietitian.    Resume home diet and restrictions, if any.

## 2024-02-14 ENCOUNTER — HOSPITAL ENCOUNTER (OUTPATIENT)
Age: 59
Discharge: HOME | End: 2024-02-14
Payer: MEDICAID

## 2024-02-14 DIAGNOSIS — I48.91 NEW ONSET ATRIAL FIBRILLATION (HCC): ICD-10-CM

## 2024-02-14 LAB
ALBUMIN SERPL-MCNC: 3.6 G/DL (ref 3.5–5.2)
ALP SERPL-CCNC: 73 U/L (ref 35–104)
ALT SERPL-CCNC: 14 U/L (ref 5–33)
ANION GAP SERPL CALCULATED.3IONS-SCNC: 11 MMOL/L (ref 9–17)
AST SERPL-CCNC: 11 U/L
BASOPHILS # BLD: 0.05 K/UL (ref 0–0.2)
BASOPHILS NFR BLD: 1 % (ref 0–2)
BILIRUB SERPL-MCNC: 0.4 MG/DL (ref 0.3–1.2)
BUN SERPL-MCNC: 13 MG/DL (ref 6–20)
BUN/CREAT SERPL: 19 (ref 9–20)
CALCIUM SERPL-MCNC: 9 MG/DL (ref 8.6–10.4)
CHLORIDE SERPL-SCNC: 96 MMOL/L (ref 98–107)
CO2 SERPL-SCNC: 29 MMOL/L (ref 20–31)
CREAT SERPL-MCNC: 0.7 MG/DL (ref 0.5–0.9)
EOSINOPHIL # BLD: 0.03 K/UL (ref 0–0.44)
EOSINOPHILS RELATIVE PERCENT: 0 % (ref 1–4)
ERYTHROCYTE [DISTWIDTH] IN BLOOD BY AUTOMATED COUNT: 19.8 % (ref 11.8–14.4)
GFR SERPL CREATININE-BSD FRML MDRD: >60 ML/MIN/1.73M2
GLUCOSE SERPL-MCNC: 87 MG/DL (ref 70–99)
HCT VFR BLD AUTO: 59.9 % (ref 36.3–47.1)
HGB BLD-MCNC: 18.6 G/DL (ref 11.9–15.1)
IMM GRANULOCYTES # BLD AUTO: 0.08 K/UL (ref 0–0.3)
IMM GRANULOCYTES NFR BLD: 1 %
LYMPHOCYTES NFR BLD: 1.02 K/UL (ref 1.1–3.7)
LYMPHOCYTES RELATIVE PERCENT: 12 % (ref 24–43)
MCH RBC QN AUTO: 26.4 PG (ref 25.2–33.5)
MCHC RBC AUTO-ENTMCNC: 31.1 G/DL (ref 28.4–34.8)
MCV RBC AUTO: 85 FL (ref 82.6–102.9)
MONOCYTES NFR BLD: 1.01 K/UL (ref 0.1–1.2)
MONOCYTES NFR BLD: 12 % (ref 3–12)
NEUTROPHILS NFR BLD: 74 % (ref 36–65)
NEUTS SEG NFR BLD: 6.24 K/UL (ref 1.5–8.1)
NRBC BLD-RTO: 0 PER 100 WBC
PLATELET # BLD AUTO: 215 K/UL (ref 138–453)
PMV BLD AUTO: 10 FL (ref 8.1–13.5)
POTASSIUM SERPL-SCNC: 5.1 MMOL/L (ref 3.7–5.3)
PROT SERPL-MCNC: 7.1 G/DL (ref 6.4–8.3)
RBC # BLD AUTO: 7.05 M/UL (ref 3.95–5.11)
SODIUM SERPL-SCNC: 136 MMOL/L (ref 135–144)
WBC OTHER # BLD: 8.4 K/UL (ref 3.5–11.3)

## 2024-02-14 PROCEDURE — 80053 COMPREHEN METABOLIC PANEL: CPT

## 2024-02-14 PROCEDURE — 85025 COMPLETE CBC W/AUTO DIFF WBC: CPT

## 2024-02-14 PROCEDURE — 36415 COLL VENOUS BLD VENIPUNCTURE: CPT

## 2024-02-16 ENCOUNTER — TELEPHONE (OUTPATIENT)
Dept: CARDIOLOGY | Age: 59
End: 2024-02-16

## 2024-02-16 ENCOUNTER — HOSPITAL ENCOUNTER (INPATIENT)
Age: 59
LOS: 1 days | Discharge: HOME | DRG: 254 | End: 2024-02-17
Attending: EMERGENCY MEDICINE | Admitting: INTERNAL MEDICINE
Payer: MEDICAID

## 2024-02-16 DIAGNOSIS — K92.1 GASTROINTESTINAL HEMORRHAGE WITH MELENA: Primary | ICD-10-CM

## 2024-02-16 PROBLEM — K92.2 GI BLEED: Status: ACTIVE | Noted: 2024-02-16

## 2024-02-16 LAB
ALBUMIN SERPL-MCNC: 3.9 G/DL (ref 3.5–5.2)
ALBUMIN/GLOB SERPL: 1.1 {RATIO} (ref 1–2.5)
ALP SERPL-CCNC: 63 U/L (ref 35–104)
ALT SERPL-CCNC: 18 U/L (ref 5–33)
ANION GAP SERPL CALCULATED.3IONS-SCNC: 10 MMOL/L (ref 9–17)
AST SERPL-CCNC: 17 U/L
BASOPHILS # BLD: 0.05 K/UL (ref 0–0.2)
BASOPHILS NFR BLD: 1 % (ref 0–2)
BILIRUB SERPL-MCNC: 0.2 MG/DL (ref 0.3–1.2)
BUN SERPL-MCNC: 37 MG/DL (ref 6–20)
BUN/CREAT SERPL: 46 (ref 9–20)
C DIFF GDH + TOXINS A+B STL QL IA.RAPID: NEGATIVE
CALCIUM SERPL-MCNC: 8.9 MG/DL (ref 8.6–10.4)
CHLORIDE SERPL-SCNC: 96 MMOL/L (ref 98–107)
CO2 SERPL-SCNC: 26 MMOL/L (ref 20–31)
CREAT SERPL-MCNC: 0.8 MG/DL (ref 0.5–0.9)
DATE, STOOL #1: ABNORMAL
ECHO BSA: 2.11 M2
EKG ATRIAL RATE: 75 BPM
EKG P AXIS: 81 DEGREES
EKG P-R INTERVAL: 122 MS
EKG Q-T INTERVAL: 382 MS
EKG QRS DURATION: 70 MS
EKG QTC CALCULATION (BAZETT): 426 MS
EKG R AXIS: 54 DEGREES
EKG T AXIS: 39 DEGREES
EKG VENTRICULAR RATE: 75 BPM
EOSINOPHIL # BLD: <0.03 K/UL (ref 0–0.44)
EOSINOPHILS RELATIVE PERCENT: 0 % (ref 1–4)
ERYTHROCYTE [DISTWIDTH] IN BLOOD BY AUTOMATED COUNT: 19.7 % (ref 11.8–14.4)
GFR SERPL CREATININE-BSD FRML MDRD: >60 ML/MIN/1.73M2
GLUCOSE SERPL-MCNC: 101 MG/DL (ref 70–99)
HCT VFR BLD AUTO: 54 % (ref 36.3–47.1)
HCT VFR BLD AUTO: 58.7 % (ref 36.3–47.1)
HEMOCCULT SP1 STL QL: POSITIVE
HGB BLD-MCNC: 17.1 G/DL (ref 11.9–15.1)
HGB BLD-MCNC: 18.4 G/DL (ref 11.9–15.1)
IMM GRANULOCYTES # BLD AUTO: 0.05 K/UL (ref 0–0.3)
IMM GRANULOCYTES NFR BLD: 1 %
INR PPP: 1.1
LYMPHOCYTES NFR BLD: 1.89 K/UL (ref 1.1–3.7)
LYMPHOCYTES RELATIVE PERCENT: 25 % (ref 24–43)
MCH RBC QN AUTO: 26 PG (ref 25.2–33.5)
MCHC RBC AUTO-ENTMCNC: 31.3 G/DL (ref 28.4–34.8)
MCV RBC AUTO: 82.9 FL (ref 82.6–102.9)
MONOCYTES NFR BLD: 1.16 K/UL (ref 0.1–1.2)
MONOCYTES NFR BLD: 15 % (ref 3–12)
NEUTROPHILS NFR BLD: 59 % (ref 36–65)
NEUTS SEG NFR BLD: 4.52 K/UL (ref 1.5–8.1)
NRBC BLD-RTO: 0 PER 100 WBC
PARTIAL THROMBOPLASTIN TIME: 34.4 SEC (ref 26.8–34.8)
PLATELET # BLD AUTO: 178 K/UL (ref 138–453)
PMV BLD AUTO: 10.3 FL (ref 8.1–13.5)
POTASSIUM SERPL-SCNC: 5.2 MMOL/L (ref 3.7–5.3)
PROT SERPL-MCNC: 7.5 G/DL (ref 6.4–8.3)
PROTHROMBIN TIME: 14.1 SEC (ref 11.9–14.8)
RBC # BLD AUTO: 7.08 M/UL (ref 3.95–5.11)
SODIUM SERPL-SCNC: 132 MMOL/L (ref 135–144)
SPECIMEN DESCRIPTION: NORMAL
TIME, STOOL #1: 1425
WBC OTHER # BLD: 7.7 K/UL (ref 3.5–11.3)

## 2024-02-16 PROCEDURE — 87449 NOS EACH ORGANISM AG IA: CPT

## 2024-02-16 PROCEDURE — 87324 CLOSTRIDIUM AG IA: CPT

## 2024-02-16 PROCEDURE — 80053 COMPREHEN METABOLIC PANEL: CPT

## 2024-02-16 PROCEDURE — 85025 COMPLETE CBC W/AUTO DIFF WBC: CPT

## 2024-02-16 PROCEDURE — 85018 HEMOGLOBIN: CPT

## 2024-02-16 PROCEDURE — 99285 EMERGENCY DEPT VISIT HI MDM: CPT

## 2024-02-16 PROCEDURE — C9113 INJ PANTOPRAZOLE SODIUM, VIA: HCPCS | Performed by: EMERGENCY MEDICINE

## 2024-02-16 PROCEDURE — C9113 INJ PANTOPRAZOLE SODIUM, VIA: HCPCS | Performed by: INTERNAL MEDICINE

## 2024-02-16 PROCEDURE — 36415 COLL VENOUS BLD VENIPUNCTURE: CPT

## 2024-02-16 PROCEDURE — 93005 ELECTROCARDIOGRAM TRACING: CPT | Performed by: INTERNAL MEDICINE

## 2024-02-16 PROCEDURE — 2580000003 HC RX 258: Performed by: INTERNAL MEDICINE

## 2024-02-16 PROCEDURE — 82270 OCCULT BLOOD FECES: CPT

## 2024-02-16 PROCEDURE — 1200000000 HC SEMI PRIVATE

## 2024-02-16 PROCEDURE — 85610 PROTHROMBIN TIME: CPT

## 2024-02-16 PROCEDURE — 85014 HEMATOCRIT: CPT

## 2024-02-16 PROCEDURE — 2580000003 HC RX 258: Performed by: EMERGENCY MEDICINE

## 2024-02-16 PROCEDURE — 6360000002 HC RX W HCPCS: Performed by: EMERGENCY MEDICINE

## 2024-02-16 PROCEDURE — 94761 N-INVAS EAR/PLS OXIMETRY MLT: CPT

## 2024-02-16 PROCEDURE — 96365 THER/PROPH/DIAG IV INF INIT: CPT

## 2024-02-16 PROCEDURE — 6360000002 HC RX W HCPCS: Performed by: INTERNAL MEDICINE

## 2024-02-16 PROCEDURE — 93010 ELECTROCARDIOGRAM REPORT: CPT | Performed by: INTERNAL MEDICINE

## 2024-02-16 PROCEDURE — 85730 THROMBOPLASTIN TIME PARTIAL: CPT

## 2024-02-16 RX ORDER — SODIUM CHLORIDE 0.9 % (FLUSH) 0.9 %
10 SYRINGE (ML) INJECTION EVERY 12 HOURS SCHEDULED
Status: DISCONTINUED | OUTPATIENT
Start: 2024-02-16 | End: 2024-02-17 | Stop reason: HOSPADM

## 2024-02-16 RX ORDER — FUROSEMIDE 40 MG/1
40 TABLET ORAL DAILY PRN
COMMUNITY
Start: 2024-02-06

## 2024-02-16 RX ORDER — ONDANSETRON 2 MG/ML
4 INJECTION INTRAMUSCULAR; INTRAVENOUS EVERY 6 HOURS PRN
Status: DISCONTINUED | OUTPATIENT
Start: 2024-02-16 | End: 2024-02-17 | Stop reason: HOSPADM

## 2024-02-16 RX ORDER — ACETAMINOPHEN 325 MG/1
650 TABLET ORAL EVERY 6 HOURS PRN
Status: DISCONTINUED | OUTPATIENT
Start: 2024-02-16 | End: 2024-02-17 | Stop reason: HOSPADM

## 2024-02-16 RX ORDER — POLYETHYLENE GLYCOL 3350 17 G/17G
17 POWDER, FOR SOLUTION ORAL DAILY PRN
Status: DISCONTINUED | OUTPATIENT
Start: 2024-02-16 | End: 2024-02-17 | Stop reason: HOSPADM

## 2024-02-16 RX ORDER — SODIUM CHLORIDE 9 MG/ML
INJECTION, SOLUTION INTRAVENOUS CONTINUOUS
Status: DISCONTINUED | OUTPATIENT
Start: 2024-02-16 | End: 2024-02-17 | Stop reason: HOSPADM

## 2024-02-16 RX ORDER — POTASSIUM CHLORIDE 7.45 MG/ML
10 INJECTION INTRAVENOUS PRN
Status: DISCONTINUED | OUTPATIENT
Start: 2024-02-16 | End: 2024-02-17 | Stop reason: HOSPADM

## 2024-02-16 RX ORDER — SODIUM CHLORIDE 0.9 % (FLUSH) 0.9 %
10 SYRINGE (ML) INJECTION PRN
Status: DISCONTINUED | OUTPATIENT
Start: 2024-02-16 | End: 2024-02-17 | Stop reason: HOSPADM

## 2024-02-16 RX ORDER — 0.9 % SODIUM CHLORIDE 0.9 %
500 INTRAVENOUS SOLUTION INTRAVENOUS ONCE
Status: COMPLETED | OUTPATIENT
Start: 2024-02-16 | End: 2024-02-16

## 2024-02-16 RX ORDER — POTASSIUM CHLORIDE 20 MEQ/1
40 TABLET, EXTENDED RELEASE ORAL PRN
Status: DISCONTINUED | OUTPATIENT
Start: 2024-02-16 | End: 2024-02-17 | Stop reason: HOSPADM

## 2024-02-16 RX ORDER — ONDANSETRON 4 MG/1
4 TABLET, ORALLY DISINTEGRATING ORAL EVERY 8 HOURS PRN
Status: DISCONTINUED | OUTPATIENT
Start: 2024-02-16 | End: 2024-02-17 | Stop reason: HOSPADM

## 2024-02-16 RX ORDER — SODIUM CHLORIDE 9 MG/ML
INJECTION, SOLUTION INTRAVENOUS PRN
Status: DISCONTINUED | OUTPATIENT
Start: 2024-02-16 | End: 2024-02-17 | Stop reason: HOSPADM

## 2024-02-16 RX ORDER — ACETAMINOPHEN 650 MG/1
650 SUPPOSITORY RECTAL EVERY 6 HOURS PRN
Status: DISCONTINUED | OUTPATIENT
Start: 2024-02-16 | End: 2024-02-17 | Stop reason: HOSPADM

## 2024-02-16 RX ADMIN — PANTOPRAZOLE SODIUM 8 MG/HR: 40 INJECTION, POWDER, FOR SOLUTION INTRAVENOUS at 15:20

## 2024-02-16 RX ADMIN — SODIUM CHLORIDE 500 ML: 9 INJECTION, SOLUTION INTRAVENOUS at 14:00

## 2024-02-16 RX ADMIN — SODIUM CHLORIDE: 9 INJECTION, SOLUTION INTRAVENOUS at 21:00

## 2024-02-16 RX ADMIN — SODIUM CHLORIDE: 9 INJECTION, SOLUTION INTRAVENOUS at 17:35

## 2024-02-16 RX ADMIN — PANTOPRAZOLE SODIUM 80 MG: 40 INJECTION, POWDER, FOR SOLUTION INTRAVENOUS at 14:19

## 2024-02-16 RX ADMIN — PANTOPRAZOLE SODIUM 8 MG/HR: 40 INJECTION, POWDER, FOR SOLUTION INTRAVENOUS at 17:20

## 2024-02-16 ASSESSMENT — PAIN - FUNCTIONAL ASSESSMENT
PAIN_FUNCTIONAL_ASSESSMENT: NONE - DENIES PAIN

## 2024-02-16 NOTE — PROGRESS NOTES
Pt arrived to unit from ER to room 330 for GI Bleed. Pt moved to bed from stretcher. Pt is on room air, respirations even and unlabored. Vitals obtained. Admission assessment completed. Pt oriented to room. Reviewed orders. Pt denies further needs at this time. Call light in reach. Care ongoing.

## 2024-02-16 NOTE — ED PROVIDER NOTES
Eliquis for atrial fibrillation.  Patient denies any abdominal pain.  There is no history of syncopal episodes denies any vomiting no history of syncopal episodes    Blood pressures during emergency department course to become quite soft 97/56    BUN elevated 37/creatinine 0.8    Source with a dark black melanotic stools concerning for upper GI bleed therefore IV Protonix was initiated as a load and continuous infusion    Patient also received normal saline 500 mL    Consultation undertaken with Dr. Phan GI/surgical subspecialist will see the patient in consult 18.4 stool is positive for occult blood    Consultation undertaken with Dr. Beckett agreeable for admission        Shared Decision Making--undertaken with the patient considering investigative work-up treatment disposition follow-up of which the patient also concurs           Disposition discussion with patient/family: Patient is in agreement with admission    All questions answered and addressed    Code Status Discussion: Patient is full code            CRITICAL CARE TIME   Total Critical Care time was minutes, excluding separately reportable procedures.  There was a high probability of clinically significant/life threatening deterioration in the patient's condition which required my urgent intervention.             PROCEDURES:    Procedures       ED BEDSIDE ULTRASOUND:   Performed by ED Physician - none      FINAL IMPRESSION     1. Gastrointestinal hemorrhage with melena          DISPOSITION/PLAN   DISPOSITION Decision To Admit 02/16/2024 03:46:23 PM      PATIENT REFERRED TO:  No follow-up provider specified.    DISCHARGE MEDICATIONS:  New Prescriptions    No medications on file     Controlled Substances Monitoring:          No data to display                (Please note that portions of this note were completed with a voice recognition program.  Efforts were made to edit the dictations but occasionally words are mis-transcribed.)    Ronald Martines MD  (electronically signed)  Attending Emergency Physician           Ronald Martines MD  02/16/24 [id number]

## 2024-02-16 NOTE — TELEPHONE ENCOUNTER
Pt came to the window stating ever since starting eliquis she has very black stools. I did advise she needs to go to the ED. She verbalized understanding however she was unable to go at this time but will go.

## 2024-02-17 VITALS
DIASTOLIC BLOOD PRESSURE: 65 MMHG | TEMPERATURE: 97.4 F | SYSTOLIC BLOOD PRESSURE: 116 MMHG | HEART RATE: 77 BPM | RESPIRATION RATE: 16 BRPM | HEIGHT: 66 IN | BODY MASS INDEX: 32.64 KG/M2 | OXYGEN SATURATION: 90 % | WEIGHT: 203.1 LBS

## 2024-02-17 PROBLEM — J44.9 COPD (CHRONIC OBSTRUCTIVE PULMONARY DISEASE) (HCC): Chronic | Status: ACTIVE | Noted: 2024-02-04

## 2024-02-17 PROBLEM — J96.01 ACUTE RESPIRATORY FAILURE WITH HYPOXIA AND HYPERCAPNIA (HCC): Status: RESOLVED | Noted: 2024-02-04 | Resolved: 2024-02-17

## 2024-02-17 PROBLEM — J96.02 ACUTE RESPIRATORY FAILURE WITH HYPOXIA AND HYPERCAPNIA (HCC): Status: RESOLVED | Noted: 2024-02-04 | Resolved: 2024-02-17

## 2024-02-17 PROBLEM — E11.9 TYPE 2 DIABETES MELLITUS WITHOUT COMPLICATION, WITHOUT LONG-TERM CURRENT USE OF INSULIN (HCC): Chronic | Status: ACTIVE | Noted: 2024-02-05

## 2024-02-17 LAB
ALBUMIN SERPL-MCNC: 3.3 G/DL (ref 3.5–5.2)
ALBUMIN/GLOB SERPL: 1.1 {RATIO} (ref 1–2.5)
ALP SERPL-CCNC: 54 U/L (ref 35–104)
ALT SERPL-CCNC: 13 U/L (ref 5–33)
ANION GAP SERPL CALCULATED.3IONS-SCNC: 10 MMOL/L (ref 9–17)
AST SERPL-CCNC: 12 U/L
BASOPHILS # BLD: 0.03 K/UL (ref 0–0.2)
BASOPHILS NFR BLD: 1 % (ref 0–2)
BILIRUB SERPL-MCNC: 0.2 MG/DL (ref 0.3–1.2)
BUN SERPL-MCNC: 24 MG/DL (ref 6–20)
BUN/CREAT SERPL: 34 (ref 9–20)
CALCIUM SERPL-MCNC: 7.9 MG/DL (ref 8.6–10.4)
CHLORIDE SERPL-SCNC: 104 MMOL/L (ref 98–107)
CO2 SERPL-SCNC: 22 MMOL/L (ref 20–31)
CREAT SERPL-MCNC: 0.7 MG/DL (ref 0.5–0.9)
EOSINOPHIL # BLD: <0.03 K/UL (ref 0–0.44)
EOSINOPHILS RELATIVE PERCENT: 0 % (ref 1–4)
ERYTHROCYTE [DISTWIDTH] IN BLOOD BY AUTOMATED COUNT: 19.7 % (ref 11.8–14.4)
GFR SERPL CREATININE-BSD FRML MDRD: >60 ML/MIN/1.73M2
GLUCOSE SERPL-MCNC: 87 MG/DL (ref 70–99)
HCT VFR BLD AUTO: 52.1 % (ref 36.3–47.1)
HGB BLD-MCNC: 16.2 G/DL (ref 11.9–15.1)
IMM GRANULOCYTES # BLD AUTO: 0.03 K/UL (ref 0–0.3)
IMM GRANULOCYTES NFR BLD: 1 %
LYMPHOCYTES NFR BLD: 1.51 K/UL (ref 1.1–3.7)
LYMPHOCYTES RELATIVE PERCENT: 32 % (ref 24–43)
MCH RBC QN AUTO: 26 PG (ref 25.2–33.5)
MCHC RBC AUTO-ENTMCNC: 31.1 G/DL (ref 28.4–34.8)
MCV RBC AUTO: 83.6 FL (ref 82.6–102.9)
MONOCYTES NFR BLD: 0.76 K/UL (ref 0.1–1.2)
MONOCYTES NFR BLD: 16 % (ref 3–12)
NEUTROPHILS NFR BLD: 51 % (ref 36–65)
NEUTS SEG NFR BLD: 2.4 K/UL (ref 1.5–8.1)
NRBC BLD-RTO: 0 PER 100 WBC
PLATELET # BLD AUTO: 158 K/UL (ref 138–453)
PMV BLD AUTO: 10 FL (ref 8.1–13.5)
POTASSIUM SERPL-SCNC: 4.5 MMOL/L (ref 3.7–5.3)
PROT SERPL-MCNC: 6.2 G/DL (ref 6.4–8.3)
RBC # BLD AUTO: 6.23 M/UL (ref 3.95–5.11)
SODIUM SERPL-SCNC: 136 MMOL/L (ref 135–144)
WBC OTHER # BLD: 4.7 K/UL (ref 3.5–11.3)

## 2024-02-17 PROCEDURE — 80053 COMPREHEN METABOLIC PANEL: CPT

## 2024-02-17 PROCEDURE — 2580000003 HC RX 258: Performed by: INTERNAL MEDICINE

## 2024-02-17 PROCEDURE — 6360000002 HC RX W HCPCS: Performed by: INTERNAL MEDICINE

## 2024-02-17 PROCEDURE — 85025 COMPLETE CBC W/AUTO DIFF WBC: CPT

## 2024-02-17 PROCEDURE — 94761 N-INVAS EAR/PLS OXIMETRY MLT: CPT

## 2024-02-17 PROCEDURE — C9113 INJ PANTOPRAZOLE SODIUM, VIA: HCPCS | Performed by: INTERNAL MEDICINE

## 2024-02-17 PROCEDURE — 36415 COLL VENOUS BLD VENIPUNCTURE: CPT

## 2024-02-17 RX ORDER — SODIUM CHLORIDE 9 MG/ML
INJECTION, SOLUTION INTRAVENOUS
Status: DISPENSED
Start: 2024-02-17 | End: 2024-02-17

## 2024-02-17 RX ORDER — PANTOPRAZOLE SODIUM 40 MG/1
40 TABLET, DELAYED RELEASE ORAL
Qty: 30 TABLET | Refills: 0 | Status: SHIPPED | OUTPATIENT
Start: 2024-02-17

## 2024-02-17 RX ORDER — PANTOPRAZOLE SODIUM 40 MG/10ML
INJECTION, POWDER, LYOPHILIZED, FOR SOLUTION INTRAVENOUS
Status: DISPENSED
Start: 2024-02-17 | End: 2024-02-17

## 2024-02-17 RX ADMIN — PANTOPRAZOLE SODIUM 8 MG/HR: 40 INJECTION, POWDER, FOR SOLUTION INTRAVENOUS at 01:10

## 2024-02-17 RX ADMIN — SODIUM CHLORIDE: 9 INJECTION, SOLUTION INTRAVENOUS at 07:10

## 2024-02-17 RX ADMIN — PANTOPRAZOLE SODIUM 8 MG/HR: 40 INJECTION, POWDER, FOR SOLUTION INTRAVENOUS at 09:07

## 2024-02-17 NOTE — PLAN OF CARE
Problem: Discharge Planning  Goal: Discharge to home or other facility with appropriate resources  2/17/2024 0916 by Edwige Spears RN  Outcome: Progressing  2/16/2024 2115 by Minnie Palencia, RN  Outcome: Progressing  Flowsheets (Taken 2/16/2024 2115)  Discharge to home or other facility with appropriate resources:   Identify barriers to discharge with patient and caregiver   Arrange for needed discharge resources and transportation as appropriate   Identify discharge learning needs (meds, wound care, etc)     Problem: Safety - Adult  Goal: Free from fall injury  2/17/2024 0916 by Edwige Spears RN  Outcome: Progressing  2/16/2024 2115 by Minnie Palencia, RN  Outcome: Progressing  Flowsheets (Taken 2/16/2024 2115)  Free From Fall Injury:   Instruct family/caregiver on patient safety   Based on caregiver fall risk screen, instruct family/caregiver to ask for assistance with transferring infant if caregiver noted to have fall risk factors     Problem: ABCDS Injury Assessment  Goal: Absence of physical injury  2/17/2024 0916 by Edwige Spears RN  Outcome: Progressing  2/16/2024 2115 by Minnie Palencia, RN  Outcome: Progressing  Flowsheets (Taken 2/16/2024 2115)  Absence of Physical Injury: Implement safety measures based on patient assessment

## 2024-02-17 NOTE — PROGRESS NOTES
Spiritual Services Interventions  0330/0330-01   2/17/2024  Ángel Sloan    Sully Thomas  58 y.o. year old female    Encounter Summary  Encounter Overview/Reason : (P) Initial Encounter  Service Provided For:: (P) Patient  Referral/Consult From:: (P) Rounding  Last Encounter : (P) 02/17/24  Complexity of Encounter: (P) Moderate  Begin Time: (P) 1300  End Time : (P) 1315  Total Time Calculated: (P) 15 min     Spiritual/Emotional needs  Type: (P) Spiritual Support                    Assessment/Intervention/Outcome  Assessment: (P) Calm  Intervention: (P) Discussed belief system/Gnosticist practices/cheryl, Discussed illness injury and it’s impact  Outcome: (P) Encouraged, Engaged in conversation

## 2024-02-17 NOTE — PLAN OF CARE
Problem: Discharge Planning  Goal: Discharge to home or other facility with appropriate resources  2/17/2024 1354 by Gillian Fitzgerald RN  Outcome: Completed  2/17/2024 0916 by Edwige Spears RN  Outcome: Progressing     Problem: Safety - Adult  Goal: Free from fall injury  2/17/2024 1354 by Gillian Fitzgerald, RN  Outcome: Completed  2/17/2024 0916 by Edwige Spears RN  Outcome: Progressing     Problem: ABCDS Injury Assessment  Goal: Absence of physical injury  2/17/2024 1354 by Gillian Fitzgerald, RN  Outcome: Completed  2/17/2024 0916 by Edwige Spears RN  Outcome: Progressing

## 2024-02-17 NOTE — PROGRESS NOTES
[facility]  Inpatient/Observation/Outpatient Rehabilitation    Date: 2024  Patient Name: Sully Thomas       [x] Inpatient Acute/Observation       []  Outpatient  : 1965         Pt seated edge of bed upon therapist arrival. Pt reporting no pain and that she is independent at baseline. Pt denies use of assistive devices. Pt stating she is at her functional baseline and going home today. Declines therapy needs. Therapist educated pt to inform nursing staff should mobility needs change.            Marie Villalobos, PT, DPT Date: 2024

## 2024-02-17 NOTE — PROGRESS NOTES
Vitals and assessment completed at this time as charted. Patient resting in bed with no distress noted. Patient denies any pain at this time. Patient denies any needs at this time. Call light within reach.

## 2024-02-17 NOTE — PLAN OF CARE
Problem: Discharge Planning  Goal: Discharge to home or other facility with appropriate resources  Outcome: Progressing  Flowsheets (Taken 2/16/2024 2115)  Discharge to home or other facility with appropriate resources:   Identify barriers to discharge with patient and caregiver   Arrange for needed discharge resources and transportation as appropriate   Identify discharge learning needs (meds, wound care, etc)     Problem: Safety - Adult  Goal: Free from fall injury  Outcome: Progressing  Flowsheets (Taken 2/16/2024 2115)  Free From Fall Injury:   Instruct family/caregiver on patient safety   Based on caregiver fall risk screen, instruct family/caregiver to ask for assistance with transferring infant if caregiver noted to have fall risk factors     Problem: ABCDS Injury Assessment  Goal: Absence of physical injury  Outcome: Progressing  Flowsheets (Taken 2/16/2024 2115)  Absence of Physical Injury: Implement safety measures based on patient assessment

## 2024-02-17 NOTE — DISCHARGE SUMMARY
Celso Beckett M.D.  Internal Medicine Discharge Summary    Patient ID:  Sully Thomas  [id number]  1965    Admission date: 2/16/2024    Discharge date: 2/17/2024     Admitting Physician: Celso Beckett MD     Primary Care Physician: Lionel Covarrubias MD     Primary Discharge Diagnoses:   Melena   Recent onset atrial fibrillation - started on Eliquis         Additional Diagnoses:       Diagnosis Date    Acute idiopathic pericarditis 02/05/2024    Type 2 diabetes mellitus without complication, without long-term current use of insulin ([facility]) 02/05/2024        Hospital Course:   Sully Thomas is a 58 y.o. female who was admitted for Melena.  She states she started having black diarrhea around 2000 pm Thursday night.  She had had spaghetti for dinner earlier that evening.  She denies any abdominal pain.  She has no h/o PUD, GERD or any other stomach issues.   She was recently admitted for new onset atrial fibrillation and started on Amiodarone and Eliquis.   She called her PCP yesterday after her melena diarrhea stool, who instructed her to go to the ER to get blood work done.   In ER her Hb was 18.6.  vitals were stable.  ER MD discussed case with Gen Surg Dr. Phan who apparently stated he would see pt in consult but is not available over this weekend.   I was asked to admit for GI bleed on Eliquis for serial H/H.  She has not had any further melena overnight.  She states she feels fine and wants to go home.  She has no abd pain.  she tolerated regular diet without any symptoms     She was deemed medically stable for discharge and was amenable to the discharge plan.      Discharge Exam:  GEN:    Awake, alert and oriented x3.   EYES:  EOMI, pupils equal   NECK: Supple. No lymphadenopathy.  No carotid bruit  CVS:    regular rate and rhythm, no audible murmur  PULM:  CTA, no wheezes, rales or rhonchi, no acute respiratory distress  ABD:    Bowels sounds normal.  Abdomen is soft.  No distention.  no tenderness

## 2024-02-17 NOTE — PROGRESS NOTES
Discharge instructions given to patient with correct verbal teach back and copy, IV removed, patient discharged home at this time.

## 2024-02-17 NOTE — PROGRESS NOTES
Vitals and assessment complete at this time. See flowsheets for details. Vitals are WNL. Patient denies pain. Patient is A&O x4. Bowel sounds are active x4. Abdomen is soft and non tender. Patient denies further needs at this time. Call light is within reach. Care ongoing.

## 2024-02-17 NOTE — H&P
Celso Beckett M.D.  Internal Medicine History and Phyisical    Patient: Sully Thomas  Date of Admission: 2/16/2024  1:31 PM  Date of Evaluation: 2/17/2024      Subjective:  Chief Complaint:    Chief Complaint   Patient presents with    Melena     Dark stools with diarrhea that began last night about 2000.  Denies abdominal pain, nausea and vomiting.         History Obtained From:  patient, electronic medical record  PCP: Lionel Covarrubias MD      History of Present Illness:   Sully Thomas is a 58 y.o. female who presented to the ER yesterday for melena / probable upper GI bleed .  She states she started having black diarrhea around 2000 pm Thursday night.  She had had spaghetti for dinner that night.  She denies any abdominal pain.  She has no h/o PUD, GERD or any other stomach issues.   She was recently admitted for new onset atrial fibrillation and started on Amiodarone and Eliquis.   She called her PCP yesterday after her melena diarrhea stool, who instructed her to go to the ER to get blood work done.   In ER her Hb was 18.6.  vitals were stable.  ER MD discussed case with Gen Surg Dr. Phan who apparently stated he would see pt in consult but is not available over this weekend.   I was asked to admit for GI bleed on Eliquis for serial H/H.  She has not had any further melena overnight.  She states she feels fine and wants to go home.  She has no abd pain.  She is hungry and asking to eat.       Review of Systems:  Constitutional:negative  for fevers, and negative for chills.  Respiratory: negative for shortness of breath, negative for cough, and negative for wheezing  Cardiovascular: negative for chest pain, negative for palpitations, and negative for syncope  Gastrointestinal: negative for abdominal pain, negative for nausea,negative for vomiting, negative for diarrhea, negative for constipation, and negative for hematochezia or melena  Genitourinary: negative for dysuria, negative for urinary urgency,  prescriptions, over-the-counter products, herbals, cannabinoid products and vitamin/mineral/dietary/nutritional supplements.  [If 'yes\", STOP HERE]     []  The patient is not eligible for medication reconciliation; the patient is in an emergent medical situation where delaying treatment would jeopardize the patient's health    []  I did NOT confirm, update or review the patient's current list of medications today.  [DOES NOT SATISFY [employer] PER[employer]MANCE]        [facility] Advanced Care Planning documentation: (check appropriate boxes [x]  )  [x] I have confirmed that the patient's Advance Care Plan is present, Code Status is documented, or surrogate decision maker is listed in the patient's medical record  [If \"yes\", STOP HERE]     [] The patient's Advance Care Plan is NOT present because:    []  I confirmed today that the patient does not wish or was not able to name a   surrogate decision maker or provide and advance care plan.    [] Hospice care is currently being provided or has been provided within the   calendar year.    []  I did NOT confirm today the presence of an Advance Care Plan or surrogate   decision maker documented within the patient's medical record.   [DOES NOT SATISFY [facility] PERFORMANCE]      Celso Beckett MD , M.D.  2/17/2024  8:33 AM

## 2024-02-17 NOTE — PROGRESS NOTES
[facility]  Inpatient/Observation/Outpatient Rehabilitation    Date: 2024  Patient Name: Sully Thomas       [x] Inpatient Acute/Observation       []  Outpatient  : 1965     [] Pt no showed for scheduled appointment    [x] Pt refused/declined therapy at this time due to:      Patient reports independence c self care, no acute OT concerns at this  time. Patient is discharging home this date, no questions or concerns.      [] Pt cancelled due to:  [] No Reason Given   [] Sick/ill   [] Other:      Janny Menjivar OTR/REGAN Date: 2024

## 2024-02-20 ENCOUNTER — TELEPHONE (OUTPATIENT)
Dept: CARDIOLOGY | Age: 59
End: 2024-02-20

## 2024-02-20 LAB — ECHO BSA: 2.11 M2

## 2024-02-20 NOTE — TELEPHONE ENCOUNTER
----- Message from Daisy Dalal PA-C sent at 2/20/2024 12:31 PM EST -----  Please let them know that their CAM monitor was overall okay. We will discuss at detail at their follow up appointment tomorrow.

## 2024-02-21 ENCOUNTER — OFFICE VISIT (OUTPATIENT)
Dept: CARDIOLOGY | Age: 59
End: 2024-02-21
Payer: MEDICAID

## 2024-02-21 ENCOUNTER — HOSPITAL ENCOUNTER (OUTPATIENT)
Age: 59
Discharge: HOME | End: 2024-02-21
Payer: MEDICAID

## 2024-02-21 ENCOUNTER — HOSPITAL ENCOUNTER (OUTPATIENT)
Age: 59
Discharge: HOME | End: 2024-02-23
Payer: MEDICAID

## 2024-02-21 VITALS
WEIGHT: 203.04 LBS | SYSTOLIC BLOOD PRESSURE: 100 MMHG | DIASTOLIC BLOOD PRESSURE: 61 MMHG | HEIGHT: 66 IN | BODY MASS INDEX: 32.63 KG/M2

## 2024-02-21 VITALS
SYSTOLIC BLOOD PRESSURE: 100 MMHG | RESPIRATION RATE: 18 BRPM | DIASTOLIC BLOOD PRESSURE: 61 MMHG | WEIGHT: 203 LBS | BODY MASS INDEX: 32.62 KG/M2 | OXYGEN SATURATION: 90 % | HEIGHT: 66 IN | HEART RATE: 58 BPM

## 2024-02-21 DIAGNOSIS — I48.0 PAF (PAROXYSMAL ATRIAL FIBRILLATION) (HCC): ICD-10-CM

## 2024-02-21 DIAGNOSIS — J44.9 CHRONIC OBSTRUCTIVE PULMONARY DISEASE, UNSPECIFIED COPD TYPE (HCC): Chronic | ICD-10-CM

## 2024-02-21 DIAGNOSIS — J44.9 CHRONIC OBSTRUCTIVE PULMONARY DISEASE, UNSPECIFIED COPD TYPE (HCC): Primary | Chronic | ICD-10-CM

## 2024-02-21 DIAGNOSIS — R29.818 SUSPECTED SLEEP APNEA: ICD-10-CM

## 2024-02-21 DIAGNOSIS — E11.9 TYPE 2 DIABETES MELLITUS WITHOUT COMPLICATION, WITHOUT LONG-TERM CURRENT USE OF INSULIN (HCC): Chronic | ICD-10-CM

## 2024-02-21 DIAGNOSIS — E78.2 MIXED HYPERLIPIDEMIA: ICD-10-CM

## 2024-02-21 DIAGNOSIS — E66.9 CLASS 1 OBESITY WITH BODY MASS INDEX (BMI) OF 32.0 TO 32.9 IN ADULT, UNSPECIFIED OBESITY TYPE, UNSPECIFIED WHETHER SERIOUS COMORBIDITY PRESENT: ICD-10-CM

## 2024-02-21 DIAGNOSIS — R09.02 HYPOXIA: ICD-10-CM

## 2024-02-21 DIAGNOSIS — Z09 HOSPITAL DISCHARGE FOLLOW-UP: ICD-10-CM

## 2024-02-21 LAB
CHOLEST SERPL-MCNC: 199 MG/DL
CHOLESTEROL/HDL RATIO: 5.9
CRP SERPL HS-MCNC: <3 MG/L (ref 0–5)
ECHO AO ROOT DIAM: 3 CM
ECHO AO ROOT INDEX: 1.49 CM/M2
ECHO AV CUSP MM: 1.8 CM
ECHO BSA: 2.07 M2
ECHO LA DIAMETER INDEX: 2.19 CM/M2
ECHO LA DIAMETER: 4.4 CM
ECHO LA MAJOR AXIS: 4.8 CM
ECHO LA TO AORTIC ROOT RATIO: 1.47
ECHO LA VOL BP: 40 ML (ref 22–52)
ECHO LA VOL MOD A2C: 44 ML (ref 22–52)
ECHO LA VOL MOD A4C: 34 ML (ref 22–52)
ECHO LA VOL/BSA BIPLANE: 20 ML/M2 (ref 16–34)
ECHO LA VOLUME AREA LENGTH: 42 ML
ECHO LA VOLUME INDEX AREA LENGTH: 21 ML/M2 (ref 16–34)
ECHO LA VOLUME INDEX MOD A2C: 22 ML/M2 (ref 16–34)
ECHO LA VOLUME INDEX MOD A4C: 17 ML/M2 (ref 16–34)
ECHO LV EDV A2C: 52 ML
ECHO LV EDV A4C: 44 ML
ECHO LV EDV BP: 48 ML (ref 56–104)
ECHO LV EDV INDEX A4C: 22 ML/M2
ECHO LV EDV INDEX BP: 24 ML/M2
ECHO LV EDV NDEX A2C: 26 ML/M2
ECHO LV EJECTION FRACTION BIPLANE: 60 % (ref 55–100)
ECHO LV ESV A2C: 20 ML
ECHO LV ESV A4C: 18 ML
ECHO LV ESV BP: 19 ML (ref 19–49)
ECHO LV ESV INDEX A2C: 10 ML/M2
ECHO LV ESV INDEX A4C: 9 ML/M2
ECHO LV ESV INDEX BP: 9 ML/M2
ECHO LV FRACTIONAL SHORTENING: 34 % (ref 28–44)
ECHO LV INTERNAL DIMENSION DIASTOLE INDEX: 2.49 CM/M2
ECHO LV INTERNAL DIMENSION DIASTOLIC: 5 CM (ref 3.9–5.3)
ECHO LV INTERNAL DIMENSION SYSTOLIC INDEX: 1.64 CM/M2
ECHO LV INTERNAL DIMENSION SYSTOLIC: 3.3 CM
ECHO LV IVSD: 1.1 CM (ref 0.6–0.9)
ECHO LV IVSS: 1.3 CM
ECHO LV MASS 2D: 207.1 G (ref 67–162)
ECHO LV MASS INDEX 2D: 103.1 G/M2 (ref 43–95)
ECHO LV POSTERIOR WALL DIASTOLIC: 1.1 CM (ref 0.6–0.9)
ECHO LV POSTERIOR WALL SYSTOLIC: 1.4 CM
ECHO LV RELATIVE WALL THICKNESS RATIO: 0.44
ERYTHROCYTE [SEDIMENTATION RATE] IN BLOOD BY PHOTOMETRIC METHOD: 8 MM/HR (ref 0–30)
HDLC SERPL-MCNC: 34 MG/DL
LDLC SERPL CALC-MCNC: 103 MG/DL (ref 0–130)
TRIGL SERPL-MCNC: 312 MG/DL

## 2024-02-21 PROCEDURE — 3044F HG A1C LEVEL LT 7.0%: CPT | Performed by: INTERNAL MEDICINE

## 2024-02-21 PROCEDURE — 85652 RBC SED RATE AUTOMATED: CPT

## 2024-02-21 PROCEDURE — 80061 LIPID PANEL: CPT

## 2024-02-21 PROCEDURE — 36415 COLL VENOUS BLD VENIPUNCTURE: CPT

## 2024-02-21 PROCEDURE — 1111F DSCHRG MED/CURRENT MED MERGE: CPT | Performed by: INTERNAL MEDICINE

## 2024-02-21 PROCEDURE — 99214 OFFICE O/P EST MOD 30 MIN: CPT | Performed by: INTERNAL MEDICINE

## 2024-02-21 PROCEDURE — 86140 C-REACTIVE PROTEIN: CPT

## 2024-02-21 PROCEDURE — 93308 TTE F-UP OR LMTD: CPT

## 2024-02-21 NOTE — PROGRESS NOTES
yearly.  [id number] Score for Atrial Fibrillation Stroke Risk   Risk   Factors  Component Value   C CHF No 0   H HTN No 0   A2 Age >= 75 No,  (58 y.o.) 0   D DM Yes 1   S2 Prior Stroke/TIA No 0   V Vascular Disease No 0   A Age 65-74 No,  (58 y.o.) 0   Sc Sex female 1    [id number]  Score  2   Score last updated 2/6/24 8:23 PM EST  Click here for a link to the UpToDate guideline \"Atrial Fibrillation: Anticoagulation therapy to prevent embolization    Disclaimer: Risk Score calculation is dependent on accuracy of patient problem list and past encounter diagnosis.   Stroke Risk: CHADS2-VASc Score: 2/9 (2.2% stroke risk)  Anticoagulation: Continue Apixaban (Eliquis) 5 mg every 12 hours.  Additional Testing List: I ordered a echocardiogram to better assess for the etiology of this problem and to help guide future management a limited echo STAT to rule out pericardial effusion.   I ordered a C-reactive protein and a sedimentation rate giving recent diagnosis of pericarditis.    Multiple risk factors for CAD including diabetes, hypertension and history of smoking.  No family history of premature CAD.  She reported having family history of diabetes and hypertension.  Continue aspirin 81 mg daily.   Continue Metoprolol and Lipitor.  Currently asymptomatic, No indication for ischemic work up at this point.  Counseled extensively to come to the ER if she developed chest pain or worsening SOB as this can be life-threatening      Recent acute hypoxemic/hypercarbic respiratory failure.  History suggestive of obstructive sleep apnea syndrome/chronic lung disease.  Patient does have leukocytosis and thrombocytosis likely secondary to chronic hypoxemia.  Continue Aspirin.  Scheduled to see Dr. Shipman in1-2 weeks.     Tobacco Abuse Counseling: I spent several minutes discussing the dangers of tobacco abuse as well as multiple methods for trying to quit smoking. In the end, Ms. Thomas said that she did not want any assistance

## 2024-02-22 ENCOUNTER — TELEPHONE (OUTPATIENT)
Dept: CARDIOLOGY | Age: 59
End: 2024-02-22

## 2024-02-22 NOTE — TELEPHONE ENCOUNTER
----- Message from Drake Davis MD sent at 2/21/2024  9:38 PM EST -----  Echo and blood work are good  Continue current therapy and follow up.  Please call with questions and/or concerns. Thank you

## 2024-03-05 PROBLEM — R79.89 ELEVATED TROPONIN: Status: RESOLVED | Noted: 2024-02-04 | Resolved: 2024-03-05

## 2024-03-18 ENCOUNTER — OFFICE VISIT (OUTPATIENT)
Dept: PULMONOLOGY | Age: 59
End: 2024-03-18
Payer: MEDICAID

## 2024-03-18 VITALS
HEART RATE: 54 BPM | DIASTOLIC BLOOD PRESSURE: 82 MMHG | BODY MASS INDEX: 33.17 KG/M2 | HEIGHT: 66 IN | RESPIRATION RATE: 16 BRPM | WEIGHT: 206.4 LBS | SYSTOLIC BLOOD PRESSURE: 130 MMHG | TEMPERATURE: 96.8 F | OXYGEN SATURATION: 93 %

## 2024-03-18 DIAGNOSIS — I48.91 NEW ONSET ATRIAL FIBRILLATION (HCC): ICD-10-CM

## 2024-03-18 DIAGNOSIS — G47.33 OSA (OBSTRUCTIVE SLEEP APNEA): ICD-10-CM

## 2024-03-18 DIAGNOSIS — R91.8 MULTIPLE NODULES OF LUNG: ICD-10-CM

## 2024-03-18 DIAGNOSIS — J44.9 CHRONIC OBSTRUCTIVE PULMONARY DISEASE, UNSPECIFIED COPD TYPE (HCC): Primary | ICD-10-CM

## 2024-03-18 PROCEDURE — 99214 OFFICE O/P EST MOD 30 MIN: CPT | Performed by: INTERNAL MEDICINE

## 2024-03-18 NOTE — PROGRESS NOTES
further  follow-up is recommended.       CT scan of the chest without contrast was done on 2/6/2024 and it showed-  1. Consolidation along the posterior lower lobes at the lung bases extensions  of pleural-parenchymal bandlike densities which have developed since the  study from 2 days ago. Atelectasis favored over pneumonia.  2. Several scattered sub 5 mm nodules along the periphery of the left upper  lobe and right upper lobe. Infectious/inflammatory process favored.  3. Dilated pulmonary trunk which can be seen in pulmonary hypertension      IMPRESSION:   1. Chronic obstructive pulmonary disease, unspecified COPD type (HCC)    2. Multiple nodules of lung    3. New onset atrial fibrillation (HCC)                   PLAN:       Reviewed the 2 CT scans of the chest  Ordered a CT scan of the chest to be done in August 2024 and also pulmonary function tests  Refills were provided -none  Patient was recommended to have prednisone and an antibiotic available for use during an exacerbation  Educated and clarified the medication use.  Discussed use, benefit, and side effects of prescribed medications.  Barriers to medication compliance addressed.   Sully Thomas received counseling on the following healthy behaviors: nutrition, exercise and medication adherence  Recommend flu vaccination in the fall annually.  Recommendations given regarding pneumococcal vaccinations.  Recommend COVID-19 vaccination  Patient is not uptodate with vaccinations from pulmonary perspective.  Maintain an active lifestyle.  continue smoking cessation.  Recommend pulmonary rehabilitation.  Patient was explained importance of pulmonary rehabilitation with regards to decreasing the hospitalization risk and also help with his dyspnea  Patient was educated on how to use the respiratory medications.  All the questions that the patient has had were answered to   her satisfaction.  Home O2 evaluation was done.  Supplemental oxygen was not indicated  Pt

## 2024-06-24 ENCOUNTER — TELEPHONE (OUTPATIENT)
Dept: CARDIOLOGY | Age: 59
End: 2024-06-24

## 2024-06-24 RX ORDER — AMIODARONE HYDROCHLORIDE 200 MG/1
200 TABLET ORAL DAILY
Qty: 90 TABLET | Refills: 3 | Status: SHIPPED | OUTPATIENT
Start: 2024-06-24

## 2024-06-24 RX ORDER — METOPROLOL TARTRATE 50 MG/1
50 TABLET, FILM COATED ORAL 2 TIMES DAILY
Qty: 180 TABLET | Refills: 3 | Status: SHIPPED | OUTPATIENT
Start: 2024-06-24

## 2024-06-24 RX ORDER — ASPIRIN 81 MG/1
81 TABLET, CHEWABLE ORAL DAILY
Qty: 90 TABLET | Refills: 3 | Status: SHIPPED | OUTPATIENT
Start: 2024-06-24

## 2024-08-06 ENCOUNTER — HOSPITAL ENCOUNTER (OUTPATIENT)
Dept: CT IMAGING | Age: 59
Discharge: HOME | End: 2024-08-08
Attending: INTERNAL MEDICINE
Payer: MEDICAID

## 2024-08-06 DIAGNOSIS — R91.8 MULTIPLE NODULES OF LUNG: ICD-10-CM

## 2024-08-06 PROCEDURE — 71250 CT THORAX DX C-: CPT

## 2024-08-26 ENCOUNTER — HOSPITAL ENCOUNTER (OUTPATIENT)
Dept: PULMONOLOGY | Age: 59
Discharge: HOME | End: 2024-08-26
Payer: MEDICAID

## 2024-08-26 PROCEDURE — 94729 DIFFUSING CAPACITY: CPT

## 2024-08-26 PROCEDURE — 94060 EVALUATION OF WHEEZING: CPT

## 2024-08-26 PROCEDURE — 94726 PLETHYSMOGRAPHY LUNG VOLUMES: CPT

## 2024-08-26 PROCEDURE — 6370000000 HC RX 637 (ALT 250 FOR IP): Performed by: INTERNAL MEDICINE

## 2024-08-26 PROCEDURE — 94664 DEMO&/EVAL PT USE INHALER: CPT

## 2024-08-26 RX ORDER — ALBUTEROL SULFATE 90 UG/1
4 AEROSOL, METERED RESPIRATORY (INHALATION) ONCE
Status: COMPLETED | OUTPATIENT
Start: 2024-08-26 | End: 2024-08-26

## 2024-08-26 RX ADMIN — ALBUTEROL SULFATE 4 PUFF: 90 AEROSOL, METERED RESPIRATORY (INHALATION) at 07:49

## 2024-08-28 NOTE — PROCEDURES
[address] [address]                           PULMONARY FUNCTION      PATIENT NAME: JAKY BLACK               : 1965  MED REC NO: [id number]                          ROOM:   ACCOUNT NO: [id number]                       ADMIT DATE: 2024  PROVIDER: Brian Evans MD      DATE OF PROCEDURE: 2024    Spirometry shows a flow volume loop which is obstructive.  FEV1 is 67% predicted, with 1% change with bronchodilation to 69% predicted.  FVC 98% predicted, with 4% bronchodilator changed to 103% predicted.  FEV1/FVC ratio of 54, postbronchodilator 53.    Total lung capacity by body box 119% predicted.  RV shows hyperinflation at 161% predicted.  Diffusion capacity corrected is normal at 100% predicted.  Airway resistance is increased.    FINAL IMPRESSION:  Stage II chronic obstructive pulmonary disease with a bronchodilator response that does not meet ATS criteria, hyperinflation and normal diffusion capacity are noted.  Clinical correlation advised.          BRIAN EVANS MD      D:  2024 17:43:42     T:  2024 04:31:13     /DIEGO  Job #:  [id number]     Doc#:  [id number]      Social Work ED Note/Event Note

## 2024-09-04 ENCOUNTER — OFFICE VISIT (OUTPATIENT)
Dept: CARDIOLOGY | Age: 59
End: 2024-09-04
Payer: MEDICAID

## 2024-09-04 ENCOUNTER — HOSPITAL ENCOUNTER (OUTPATIENT)
Age: 59
Discharge: HOME | End: 2024-09-04
Payer: MEDICAID

## 2024-09-04 VITALS
SYSTOLIC BLOOD PRESSURE: 125 MMHG | DIASTOLIC BLOOD PRESSURE: 71 MMHG | HEIGHT: 66 IN | HEART RATE: 60 BPM | BODY MASS INDEX: 34.55 KG/M2 | RESPIRATION RATE: 18 BRPM | OXYGEN SATURATION: 93 % | WEIGHT: 215 LBS

## 2024-09-04 DIAGNOSIS — I48.0 PAF (PAROXYSMAL ATRIAL FIBRILLATION) (HCC): ICD-10-CM

## 2024-09-04 DIAGNOSIS — E78.2 MIXED HYPERLIPIDEMIA: ICD-10-CM

## 2024-09-04 DIAGNOSIS — R94.31 ABNORMAL ELECTROCARDIOGRAPHY: ICD-10-CM

## 2024-09-04 DIAGNOSIS — E66.9 CLASS 1 OBESITY WITH BODY MASS INDEX (BMI) OF 32.0 TO 32.9 IN ADULT, UNSPECIFIED OBESITY TYPE, UNSPECIFIED WHETHER SERIOUS COMORBIDITY PRESENT: ICD-10-CM

## 2024-09-04 DIAGNOSIS — J44.9 CHRONIC OBSTRUCTIVE PULMONARY DISEASE, UNSPECIFIED COPD TYPE (HCC): ICD-10-CM

## 2024-09-04 DIAGNOSIS — I25.84 CORONARY ARTERY CALCIFICATION: ICD-10-CM

## 2024-09-04 DIAGNOSIS — I25.10 CORONARY ARTERY CALCIFICATION: Primary | ICD-10-CM

## 2024-09-04 DIAGNOSIS — I25.84 CORONARY ARTERY CALCIFICATION: Primary | ICD-10-CM

## 2024-09-04 DIAGNOSIS — R09.02 HYPOXIA: ICD-10-CM

## 2024-09-04 DIAGNOSIS — I25.10 CORONARY ARTERY CALCIFICATION: ICD-10-CM

## 2024-09-04 DIAGNOSIS — R29.818 SUSPECTED SLEEP APNEA: ICD-10-CM

## 2024-09-04 DIAGNOSIS — Z71.6 TOBACCO ABUSE COUNSELING: ICD-10-CM

## 2024-09-04 PROCEDURE — 36415 COLL VENOUS BLD VENIPUNCTURE: CPT

## 2024-09-04 PROCEDURE — 83721 ASSAY OF BLOOD LIPOPROTEIN: CPT

## 2024-09-04 PROCEDURE — 99214 OFFICE O/P EST MOD 30 MIN: CPT | Performed by: INTERNAL MEDICINE

## 2024-09-04 PROCEDURE — 80061 LIPID PANEL: CPT

## 2024-09-04 PROCEDURE — 93000 ELECTROCARDIOGRAM COMPLETE: CPT | Performed by: INTERNAL MEDICINE

## 2024-09-04 RX ORDER — ROSUVASTATIN CALCIUM 10 MG/1
10 TABLET, COATED ORAL DAILY
Qty: 90 TABLET | Refills: 1 | Status: SHIPPED | OUTPATIENT
Start: 2024-09-04

## 2024-09-04 NOTE — PROGRESS NOTES
I, Shruthi Valle am scribing for and in the presence of Drake Davis MD, F.A.C.C..    Patient: Sully Thomas  : 1965  Primary Care Physician: Lionel Covarrubias  Today's Date: 2024    REASON FOR CONSULTATION/CC: shortness of breath    HPI: I had the pleasure of seeing Ms. Thomas today who is a 59 y.o. female who presented today for follow up.    Initially seen in the hospital because of history of intermittent chest discomfort.     She denies any family history of heart disease.   She is current smoker.    Echo done on 2024- EF 60-65%  Left ventricle size is normal. Mildly increased wall thickness. Normal wall motion. Grade I diastolic dysfunction with normal LAP. Tricuspid Valve: Mild regurgitation. The estimated RVSP is 38 mmHg.    CAM done on 24:  Predominant rhythm: NSR   - Atrial Tachycardia (AT) or short runs of atrial fibrillation, 14 episodes, Longest 8 beats @ Avg 162 bpm up to 183 bpm, Fastest 7 beats @ Avg 164 bpm up to 186 bpm - Ventricular Tachycardia (VT) 1 episode, 3 beats @ Avg 102 bpm up to 125 bpm   - PAC 0.5 %   - PVC 0.8 %    Echo done on 24:  Left Ventricle: Normal left ventricular systolic function with a visually estimated EF of 55 - 60%. Left ventricle size is normal. Mildly increased wall thickness. Normal wall motion. Pericardium: No significant pericardial effusion.     Ms. Thomas is here today for a 6 month follow up. She reports she is doing well. She denies any palpitations, light headed/dizziness or shortness of breath. She denies any chest pain, pressure or tightness. She is able to do anything she needs too and is not limited by anything. Her breathing is fine even with her COPD. She still heats with wood pellets and she is taking care of her and carries there 50 pound bags. She denied any current  abdominal pain, bleeding problems, problems with her medications or any other concerns at this time.    Past Medical History:   Diagnosis Date    Acute idiopathic

## 2024-09-05 LAB
CHOLEST SERPL-MCNC: 267 MG/DL (ref 0–199)
CHOLESTEROL/HDL RATIO: 8
HDLC SERPL-MCNC: 35 MG/DL
LDLC SERPL CALC-MCNC: ABNORMAL MG/DL (ref 0–100)
LDLC SERPL DIRECT ASSAY-MCNC: 177 MG/DL
TRIGL SERPL-MCNC: 423 MG/DL
VLDLC SERPL CALC-MCNC: ABNORMAL MG/DL

## 2024-09-09 DIAGNOSIS — E78.5 DYSLIPIDEMIA: Primary | ICD-10-CM

## 2024-09-10 ENCOUNTER — TELEPHONE (OUTPATIENT)
Dept: CARDIOLOGY | Age: 59
End: 2024-09-10

## 2024-09-17 ENCOUNTER — HOSPITAL ENCOUNTER (OUTPATIENT)
Dept: NUCLEAR MEDICINE | Age: 59
Discharge: HOME | End: 2024-09-19
Attending: INTERNAL MEDICINE
Payer: MEDICAID

## 2024-09-17 ENCOUNTER — HOSPITAL ENCOUNTER (OUTPATIENT)
Age: 59
Discharge: HOME | End: 2024-09-19
Attending: INTERNAL MEDICINE
Payer: MEDICAID

## 2024-09-17 DIAGNOSIS — I25.84 CORONARY ARTERY CALCIFICATION: ICD-10-CM

## 2024-09-17 DIAGNOSIS — I25.10 CORONARY ARTERY CALCIFICATION: ICD-10-CM

## 2024-09-17 DIAGNOSIS — R94.31 ABNORMAL ELECTROCARDIOGRAPHY: ICD-10-CM

## 2024-09-17 PROCEDURE — 78452 HT MUSCLE IMAGE SPECT MULT: CPT

## 2024-09-17 PROCEDURE — 6360000002 HC RX W HCPCS: Performed by: INTERNAL MEDICINE

## 2024-09-17 PROCEDURE — A9500 TC99M SESTAMIBI: HCPCS | Performed by: INTERNAL MEDICINE

## 2024-09-17 PROCEDURE — 3430000000 HC RX DIAGNOSTIC RADIOPHARMACEUTICAL: Performed by: INTERNAL MEDICINE

## 2024-09-17 RX ORDER — TETRAKIS(2-METHOXYISOBUTYLISOCYANIDE)COPPER(I) TETRAFLUOROBORATE 1 MG/ML
30 INJECTION, POWDER, LYOPHILIZED, FOR SOLUTION INTRAVENOUS
Status: COMPLETED | OUTPATIENT
Start: 2024-09-17 | End: 2024-09-17

## 2024-09-17 RX ORDER — REGADENOSON 0.08 MG/ML
0.4 INJECTION, SOLUTION INTRAVENOUS
Status: COMPLETED | OUTPATIENT
Start: 2024-09-17 | End: 2024-09-17

## 2024-09-17 RX ADMIN — Medication 30 MILLICURIE: at 09:46

## 2024-09-17 RX ADMIN — REGADENOSON 0.4 MG: 0.08 INJECTION, SOLUTION INTRAVENOUS at 09:26

## 2024-09-18 ENCOUNTER — HOSPITAL ENCOUNTER (OUTPATIENT)
Dept: NUCLEAR MEDICINE | Age: 59
Discharge: HOME | End: 2024-09-20
Attending: INTERNAL MEDICINE
Payer: MEDICAID

## 2024-09-18 LAB
NUC STRESS EJECTION FRACTION: 84 %
STRESS BASELINE DIAS BP: 60 MMHG
STRESS BASELINE HR: 51 BPM
STRESS BASELINE ST DEPRESSION: 0 MM
STRESS BASELINE SYS BP: 120 MMHG
STRESS ESTIMATED WORKLOAD: 1.3 METS
STRESS PEAK DIAS BP: 64 MMHG
STRESS PEAK SYS BP: 122 MMHG
STRESS PERCENT HR ACHIEVED: 47 %
STRESS POST PEAK HR: 75 BPM
STRESS RATE PRESSURE PRODUCT: 9150 BPM*MMHG
STRESS TARGET HR: 160 BPM
TID: 1.45

## 2024-09-18 PROCEDURE — A9500 TC99M SESTAMIBI: HCPCS | Performed by: INTERNAL MEDICINE

## 2024-09-18 PROCEDURE — 3430000000 HC RX DIAGNOSTIC RADIOPHARMACEUTICAL: Performed by: INTERNAL MEDICINE

## 2024-09-18 RX ORDER — TETRAKIS(2-METHOXYISOBUTYLISOCYANIDE)COPPER(I) TETRAFLUOROBORATE 1 MG/ML
30 INJECTION, POWDER, LYOPHILIZED, FOR SOLUTION INTRAVENOUS
Status: COMPLETED | OUTPATIENT
Start: 2024-09-18 | End: 2024-09-18

## 2024-09-18 RX ADMIN — Medication 30 MILLICURIE: at 14:17

## 2024-09-24 ENCOUNTER — TELEPHONE (OUTPATIENT)
Dept: CARDIOLOGY | Age: 59
End: 2024-09-24

## 2024-10-17 ENCOUNTER — OFFICE VISIT (OUTPATIENT)
Dept: CARDIOLOGY | Age: 59
End: 2024-10-17
Payer: MEDICAID

## 2024-10-17 VITALS
BODY MASS INDEX: 35.07 KG/M2 | HEART RATE: 56 BPM | SYSTOLIC BLOOD PRESSURE: 100 MMHG | DIASTOLIC BLOOD PRESSURE: 53 MMHG | OXYGEN SATURATION: 96 % | WEIGHT: 218.2 LBS | RESPIRATION RATE: 18 BRPM | HEIGHT: 66 IN

## 2024-10-17 DIAGNOSIS — I48.0 PAF (PAROXYSMAL ATRIAL FIBRILLATION) (HCC): Primary | ICD-10-CM

## 2024-10-17 DIAGNOSIS — E66.811 CLASS 1 OBESITY WITH BODY MASS INDEX (BMI) OF 32.0 TO 32.9 IN ADULT, UNSPECIFIED OBESITY TYPE, UNSPECIFIED WHETHER SERIOUS COMORBIDITY PRESENT: ICD-10-CM

## 2024-10-17 DIAGNOSIS — I25.10 CORONARY ARTERY CALCIFICATION: ICD-10-CM

## 2024-10-17 DIAGNOSIS — E11.9 TYPE 2 DIABETES MELLITUS WITHOUT COMPLICATION, WITHOUT LONG-TERM CURRENT USE OF INSULIN (HCC): ICD-10-CM

## 2024-10-17 DIAGNOSIS — Z71.6 TOBACCO ABUSE COUNSELING: ICD-10-CM

## 2024-10-17 DIAGNOSIS — J44.9 CHRONIC OBSTRUCTIVE PULMONARY DISEASE, UNSPECIFIED COPD TYPE (HCC): ICD-10-CM

## 2024-10-17 DIAGNOSIS — E78.2 MIXED HYPERLIPIDEMIA: ICD-10-CM

## 2024-10-17 DIAGNOSIS — R94.39 ABNORMAL STRESS TEST: ICD-10-CM

## 2024-10-17 PROCEDURE — 99215 OFFICE O/P EST HI 40 MIN: CPT | Performed by: INTERNAL MEDICINE

## 2024-10-17 PROCEDURE — 3044F HG A1C LEVEL LT 7.0%: CPT | Performed by: INTERNAL MEDICINE

## 2024-10-17 NOTE — PROGRESS NOTES
accuracy of patient problem list and past encounter diagnosis.   Stroke Risk: CHADS2-VASc Score: 2/9 (2.2% stroke risk)  Anticoagulation: Currently only on aspirin.  No recurrence of A-fib as outlined above.        Counseled extensively to come to the ER if she developed chest pain or worsening SOB as this can be life-threatening.  Additional Testing List: None      Patient to COPD:  Continue to follow up with Dr. Bee    Tobacco Abuse Counseling: I spent several minutes discussing the dangers of tobacco abuse as well as multiple methods for trying to quit smoking. In the end, Ms. Thomas said that she did not want any assistance at this time but would continue to try and quit reduce and eventually quit smoking in the near future.    Hyperlipidemia: Mixed. Last LDL Direct 177 on 9/4/2024.  Cholesterol Reduction Therapy: Continue rosuvastatin (Crestor) 10 mg daily.      Type II diabetes mellitus  A1C 6.7  Continue Metformin.    Obesity: Body mass index is 35.22 kg/m².   I also briefly discussed both diet and exercise strategies for her to continue to loses weight and she was very receptive to this.      Abnormal Stress Test: Multiple risk factors for CAD including diabetes, hypertension and history of smoking.  No family history of premature CAD.  She reported having family history of diabetes and hypertension.  She has coronary artery calcifications seen on chest CT.  I told her we treat this is stable coronary artery disease.    Abnormal Stress test.  For these reasons, I recommended that the patient consider undergoing a cardiac catheterization with coronary angiography to assess their coronary anatomy and facilitate better treatment recommendations. I discussed the risks, benefits, and alternatives to the procedure including the risk of bleeding, contrast induced allergy and/or kidney damage, arrythmia, stroke, heart attack, death, or the need for further procedures. I also discussed the fact that although

## 2024-10-18 ENCOUNTER — HOSPITAL ENCOUNTER (OUTPATIENT)
Age: 59
Discharge: HOME | End: 2024-10-18
Payer: MEDICAID

## 2024-10-18 DIAGNOSIS — E66.811 CLASS 1 OBESITY WITH BODY MASS INDEX (BMI) OF 32.0 TO 32.9 IN ADULT, UNSPECIFIED OBESITY TYPE, UNSPECIFIED WHETHER SERIOUS COMORBIDITY PRESENT: ICD-10-CM

## 2024-10-18 DIAGNOSIS — Z71.6 TOBACCO ABUSE COUNSELING: ICD-10-CM

## 2024-10-18 DIAGNOSIS — R94.39 ABNORMAL STRESS TEST: ICD-10-CM

## 2024-10-18 DIAGNOSIS — I25.10 CORONARY ARTERY CALCIFICATION: ICD-10-CM

## 2024-10-18 DIAGNOSIS — J44.9 CHRONIC OBSTRUCTIVE PULMONARY DISEASE, UNSPECIFIED COPD TYPE (HCC): ICD-10-CM

## 2024-10-18 DIAGNOSIS — E11.9 TYPE 2 DIABETES MELLITUS WITHOUT COMPLICATION, WITHOUT LONG-TERM CURRENT USE OF INSULIN (HCC): ICD-10-CM

## 2024-10-18 DIAGNOSIS — I48.0 PAF (PAROXYSMAL ATRIAL FIBRILLATION) (HCC): ICD-10-CM

## 2024-10-18 DIAGNOSIS — E78.2 MIXED HYPERLIPIDEMIA: ICD-10-CM

## 2024-10-18 LAB
ANION GAP SERPL CALCULATED.3IONS-SCNC: 10 MMOL/L (ref 9–16)
BUN SERPL-MCNC: 12 MG/DL (ref 8–23)
BUN/CREAT SERPL: 17 (ref 9–20)
CALCIUM SERPL-MCNC: 9.4 MG/DL (ref 8.6–10.4)
CHLORIDE SERPL-SCNC: 99 MMOL/L (ref 98–107)
CO2 SERPL-SCNC: 30 MMOL/L (ref 20–31)
CREAT SERPL-MCNC: 0.7 MG/DL (ref 0.5–0.9)
ERYTHROCYTE [DISTWIDTH] IN BLOOD BY AUTOMATED COUNT: 13.5 % (ref 11.8–14.4)
GFR, ESTIMATED: >90 ML/MIN/1.73M2
GLUCOSE SERPL-MCNC: 104 MG/DL (ref 74–99)
HCT VFR BLD AUTO: 52 % (ref 36.3–47.1)
HGB BLD-MCNC: 16.3 G/DL (ref 11.9–15.1)
MCH RBC QN AUTO: 31.7 PG (ref 25.2–33.5)
MCHC RBC AUTO-ENTMCNC: 31.3 G/DL (ref 28.4–34.8)
MCV RBC AUTO: 101 FL (ref 82.6–102.9)
NRBC BLD-RTO: 0 PER 100 WBC
PLATELET # BLD AUTO: ABNORMAL K/UL (ref 138–453)
PLATELET, FLUORESCENCE: 94 K/UL (ref 138–453)
PLATELETS.RETICULATED NFR BLD AUTO: 21.7 % (ref 1.1–10.3)
POTASSIUM SERPL-SCNC: 4.7 MMOL/L (ref 3.7–5.3)
RBC # BLD AUTO: 5.15 M/UL (ref 3.95–5.11)
SODIUM SERPL-SCNC: 139 MMOL/L (ref 136–145)
WBC OTHER # BLD: 8.2 K/UL (ref 3.5–11.3)

## 2024-10-18 PROCEDURE — 85027 COMPLETE CBC AUTOMATED: CPT

## 2024-10-18 PROCEDURE — 36415 COLL VENOUS BLD VENIPUNCTURE: CPT

## 2024-10-18 PROCEDURE — 80048 BASIC METABOLIC PNL TOTAL CA: CPT

## 2024-10-31 ENCOUNTER — HOSPITAL ENCOUNTER (OUTPATIENT)
Age: 59
Setting detail: OUTPATIENT SURGERY
Discharge: HOME | End: 2024-10-31
Attending: FAMILY MEDICINE | Admitting: FAMILY MEDICINE
Payer: MEDICAID

## 2024-10-31 VITALS
DIASTOLIC BLOOD PRESSURE: 57 MMHG | RESPIRATION RATE: 23 BRPM | SYSTOLIC BLOOD PRESSURE: 126 MMHG | OXYGEN SATURATION: 91 % | TEMPERATURE: 97.1 F | HEART RATE: 71 BPM

## 2024-10-31 DIAGNOSIS — I48.91 NEW ONSET ATRIAL FIBRILLATION (HCC): ICD-10-CM

## 2024-10-31 DIAGNOSIS — R07.9 CHEST PAIN: ICD-10-CM

## 2024-10-31 PROBLEM — R94.39 ABNORMAL CARDIOVASCULAR STRESS TEST: Status: ACTIVE | Noted: 2024-10-31

## 2024-10-31 PROCEDURE — 2500000003 HC RX 250 WO HCPCS: Performed by: FAMILY MEDICINE

## 2024-10-31 PROCEDURE — 6360000002 HC RX W HCPCS: Performed by: FAMILY MEDICINE

## 2024-10-31 PROCEDURE — 93458 L HRT ARTERY/VENTRICLE ANGIO: CPT | Performed by: FAMILY MEDICINE

## 2024-10-31 PROCEDURE — 2580000003 HC RX 258: Performed by: FAMILY MEDICINE

## 2024-10-31 PROCEDURE — 2709999900 HC NON-CHARGEABLE SUPPLY: Performed by: FAMILY MEDICINE

## 2024-10-31 PROCEDURE — 6360000004 HC RX CONTRAST MEDICATION: Performed by: FAMILY MEDICINE

## 2024-10-31 PROCEDURE — 7100000011 HC PHASE II RECOVERY - ADDTL 15 MIN: Performed by: FAMILY MEDICINE

## 2024-10-31 PROCEDURE — C1894 INTRO/SHEATH, NON-LASER: HCPCS | Performed by: FAMILY MEDICINE

## 2024-10-31 PROCEDURE — 7100000010 HC PHASE II RECOVERY - FIRST 15 MIN: Performed by: FAMILY MEDICINE

## 2024-10-31 PROCEDURE — 99152 MOD SED SAME PHYS/QHP 5/>YRS: CPT | Performed by: FAMILY MEDICINE

## 2024-10-31 PROCEDURE — C1769 GUIDE WIRE: HCPCS | Performed by: FAMILY MEDICINE

## 2024-10-31 RX ORDER — LIDOCAINE HYDROCHLORIDE 10 MG/ML
INJECTION, SOLUTION INFILTRATION; PERINEURAL PRN
Status: DISCONTINUED | OUTPATIENT
Start: 2024-10-31 | End: 2024-10-31 | Stop reason: HOSPADM

## 2024-10-31 RX ORDER — IOPAMIDOL 755 MG/ML
INJECTION, SOLUTION INTRAVASCULAR PRN
Status: DISCONTINUED | OUTPATIENT
Start: 2024-10-31 | End: 2024-10-31 | Stop reason: HOSPADM

## 2024-10-31 RX ORDER — SODIUM CHLORIDE 9 MG/ML
INJECTION, SOLUTION INTRAVENOUS PRN
Status: DISCONTINUED | OUTPATIENT
Start: 2024-10-31 | End: 2024-10-31 | Stop reason: HOSPADM

## 2024-10-31 RX ORDER — VERAPAMIL HYDROCHLORIDE 2.5 MG/ML
INJECTION, SOLUTION INTRAVENOUS PRN
Status: DISCONTINUED | OUTPATIENT
Start: 2024-10-31 | End: 2024-10-31 | Stop reason: HOSPADM

## 2024-10-31 RX ORDER — NITROGLYCERIN 20 MG/100ML
INJECTION INTRAVENOUS PRN
Status: DISCONTINUED | OUTPATIENT
Start: 2024-10-31 | End: 2024-10-31 | Stop reason: HOSPADM

## 2024-10-31 RX ORDER — SODIUM CHLORIDE 0.9 % (FLUSH) 0.9 %
5-40 SYRINGE (ML) INJECTION EVERY 12 HOURS SCHEDULED
Status: DISCONTINUED | OUTPATIENT
Start: 2024-10-31 | End: 2024-10-31 | Stop reason: HOSPADM

## 2024-10-31 RX ORDER — SODIUM CHLORIDE 9 MG/ML
INJECTION, SOLUTION INTRAVENOUS CONTINUOUS
Status: DISCONTINUED | OUTPATIENT
Start: 2024-10-31 | End: 2024-10-31 | Stop reason: HOSPADM

## 2024-10-31 RX ORDER — MIDAZOLAM HYDROCHLORIDE 1 MG/ML
INJECTION, SOLUTION INTRAMUSCULAR; INTRAVENOUS PRN
Status: DISCONTINUED | OUTPATIENT
Start: 2024-10-31 | End: 2024-10-31 | Stop reason: HOSPADM

## 2024-10-31 RX ORDER — SODIUM CHLORIDE 0.9 % (FLUSH) 0.9 %
5-40 SYRINGE (ML) INJECTION PRN
Status: DISCONTINUED | OUTPATIENT
Start: 2024-10-31 | End: 2024-10-31 | Stop reason: HOSPADM

## 2024-10-31 RX ORDER — ACETAMINOPHEN 325 MG/1
650 TABLET ORAL EVERY 4 HOURS PRN
Status: DISCONTINUED | OUTPATIENT
Start: 2024-10-31 | End: 2024-10-31 | Stop reason: HOSPADM

## 2024-10-31 RX ORDER — HEPARIN SODIUM 1000 [USP'U]/ML
INJECTION, SOLUTION INTRAVENOUS; SUBCUTANEOUS PRN
Status: DISCONTINUED | OUTPATIENT
Start: 2024-10-31 | End: 2024-10-31 | Stop reason: HOSPADM

## 2024-10-31 RX ORDER — NITROGLYCERIN 0.4 MG/1
0.4 TABLET SUBLINGUAL EVERY 5 MIN PRN
Status: DISCONTINUED | OUTPATIENT
Start: 2024-10-31 | End: 2024-10-31 | Stop reason: HOSPADM

## 2024-10-31 RX ORDER — DIPHENHYDRAMINE HCL 25 MG
50 CAPSULE ORAL ONCE
Status: DISCONTINUED | OUTPATIENT
Start: 2024-10-31 | End: 2024-10-31 | Stop reason: HOSPADM

## 2024-10-31 RX ADMIN — SODIUM CHLORIDE: 9 INJECTION, SOLUTION INTRAVENOUS at 08:56

## 2024-10-31 NOTE — DISCHARGE INSTRUCTIONS
Discharge Instructions for Cardiac Catheterization    A cardiac catheterization is a diagnostic test used to evaluate the health of the heart and its blood vessels. The test is done with a thin catheter carefully threaded into your heart from a leg or arm artery. Most likely, you will be allowed to go home the same day as the procedure.   Steps to Take at Home:   Pain- apply ice to site 15-20 minutes every hour for the first 2 days.   Showering is okay 24 hours after procedure.    No soaking in a pool, hot tub, bath tub, or standing water for one week.   Bleeding (outward or under the skin-hematoma)- apply firm pressure for 10-15 minutes or until the bleeding stops, then call your doctor. If unable to get bleeding stopped, call 911.    Kidney damage- Call if you urinate less than normal, have swelling or feel puffy, and/or gain 2 or more pounds over night in the first week.   If procedure was in ARM:  You were instructed to keep wrist straight and still for two hours after the procedure. The arm and hand may now be used for normal daily activities except, avoid using the heal of hand while getting up and down from furniture for the first few days.  Keep affected arm elevated, hand higher than elbow, while pressure dressing in place to decrease swelling.  Gauze and Elastoplast   Remove in 4 hours as follows:     TIME:____3:45PM_________________  Remove 1 piece of tape at a time, waiting 15 -20 minutes between layers to monitor for bleeding.   If dressing sticks, place wrist under cool running water to help loosen gauze from site then pat site dry.   If hand feels numb, tingly, and/or cold- loosen first 1-2 layers of tape if dressing still in place.  If no relief noticed, remove pressure dressing as per above instructions. Seek medical help if no relief or if dressing already off.     Diet   Drink plenty of fluids after the test to flush the x-ray dye from your system.   Return to your normal diet.   No alcoholic

## 2024-11-13 NOTE — PROGRESS NOTES
lung cancer screenings and willingness to undergo diagnosis and treatment if screening scan is positive.  In addition, the patient was counseled regarding the importance of remaining smoke free and/or total smoking cessation.    Also reviewed the following if the patient has Medicare that as of February 10, 2022, Medicare only covers LDCT screening in patients aged 50-77 with at least a 20 pack-year smoking history who currently smoke or have quit in the last 15 years

## 2024-11-14 ENCOUNTER — OFFICE VISIT (OUTPATIENT)
Dept: PULMONOLOGY | Age: 59
End: 2024-11-14

## 2024-11-14 VITALS
OXYGEN SATURATION: 93 % | WEIGHT: 217.1 LBS | SYSTOLIC BLOOD PRESSURE: 110 MMHG | TEMPERATURE: 97.4 F | HEIGHT: 66 IN | BODY MASS INDEX: 34.89 KG/M2 | HEART RATE: 61 BPM | DIASTOLIC BLOOD PRESSURE: 52 MMHG | RESPIRATION RATE: 16 BRPM

## 2024-11-14 DIAGNOSIS — J44.9 CHRONIC OBSTRUCTIVE PULMONARY DISEASE, UNSPECIFIED COPD TYPE (HCC): ICD-10-CM

## 2024-11-14 DIAGNOSIS — Z87.891 PERSONAL HISTORY OF TOBACCO USE: Primary | ICD-10-CM

## 2024-11-14 DIAGNOSIS — R91.8 MULTIPLE NODULES OF LUNG: ICD-10-CM

## 2024-11-14 ASSESSMENT — ENCOUNTER SYMPTOMS
GASTROINTESTINAL NEGATIVE: 1
ALLERGIC/IMMUNOLOGIC NEGATIVE: 1
EYES NEGATIVE: 1

## 2024-11-14 NOTE — PATIENT INSTRUCTIONS
SURVEY:    Thank you for allowing us to care for you today.    You may be receiving a survey from [facility] regarding your visit today- electronically or via mail.      Please help us by completing the survey as this will provide the needed feedback to ensure we are providing the very best care for you and your family.    If you cannot score us a very good on any question, please call the office to discuss how we could have made your experience a very good one.    Thank you.       STAFF:    Ghazala ARRIAGA., Aliesha Dwyer CMA      CLINICAL STAFF:    Lula DELCID LPN, Josy PAYAN LPN, Cookie PACK LPN, Flavia ARRIAGA       Learning About Lung Cancer Screening  What is screening for lung cancer?     Lung cancer screening is a way to find some lung cancers early, before a person has any symptoms of the cancer.  Lung cancer screening may help those who have the highest risk for lung cancer--people age 50 and older who are or were heavy smokers. For most people, who aren't at increased risk, screening for lung cancer probably isn't helpful.  Screening won't prevent cancer. And it may not find all lung cancers. Lung cancer screening may lower the risk of dying from lung cancer in a small number of people.  How is it done?  Lung cancer screening is done with a low-dose CT (computed tomography) scan. A CT scan uses X-rays, or radiation, to make detailed pictures of your body. Experts recommend that screening be done in medical centers that focus on finding and treating lung cancer.  Who is screening recommended for?  Lung cancer screening is recommended for people age 50 and older who are or were heavy smokers. That means people with a smoking history of at least 20 pack years. A pack year is a way to measure how heavy a smoker you are or were.  To figure out your pack years, multiply how many packs a day on average (assuming 20 cigarettes per pack) you have smoked by how many years you have smoked. For

## 2024-12-17 ENCOUNTER — HOSPITAL ENCOUNTER (OUTPATIENT)
Age: 59
Discharge: HOME | End: 2024-12-17
Payer: MEDICAID

## 2024-12-17 DIAGNOSIS — E78.5 DYSLIPIDEMIA: ICD-10-CM

## 2024-12-17 LAB
CHOLEST SERPL-MCNC: 126 MG/DL (ref 0–199)
CHOLESTEROL/HDL RATIO: 3.3
HDLC SERPL-MCNC: 38 MG/DL
LDLC SERPL CALC-MCNC: 60 MG/DL (ref 0–100)
TRIGL SERPL-MCNC: 141 MG/DL
VLDLC SERPL CALC-MCNC: 28 MG/DL (ref 1–30)

## 2024-12-17 PROCEDURE — 36415 COLL VENOUS BLD VENIPUNCTURE: CPT

## 2024-12-17 PROCEDURE — 80061 LIPID PANEL: CPT

## 2024-12-23 ENCOUNTER — TELEPHONE (OUTPATIENT)
Dept: CARDIOLOGY | Age: 59
End: 2024-12-23

## 2024-12-23 NOTE — TELEPHONE ENCOUNTER
----- Message from Dr. Drake Davis MD sent at 12/21/2024  8:33 PM EST -----  Lipid panel is good.  Thank you

## 2025-02-25 DIAGNOSIS — R94.31 ABNORMAL ELECTROCARDIOGRAPHY: ICD-10-CM

## 2025-02-25 DIAGNOSIS — I25.10 CORONARY ARTERY CALCIFICATION: ICD-10-CM

## 2025-02-25 RX ORDER — ROSUVASTATIN CALCIUM 10 MG/1
10 TABLET, COATED ORAL DAILY
Qty: 90 TABLET | Refills: 3 | Status: SHIPPED | OUTPATIENT
Start: 2025-02-25

## 2025-02-26 DIAGNOSIS — I25.10 CORONARY ARTERY CALCIFICATION: ICD-10-CM

## 2025-02-26 DIAGNOSIS — R94.31 ABNORMAL ELECTROCARDIOGRAPHY: ICD-10-CM

## 2025-02-26 RX ORDER — ROSUVASTATIN CALCIUM 10 MG/1
10 TABLET, COATED ORAL DAILY
Qty: 90 TABLET | Refills: 0 | OUTPATIENT
Start: 2025-02-26

## 2025-07-14 RX ORDER — METOPROLOL TARTRATE 50 MG
50 TABLET ORAL 2 TIMES DAILY
Qty: 180 TABLET | Refills: 3 | Status: SHIPPED | OUTPATIENT
Start: 2025-07-14

## 2025-07-14 RX ORDER — ASPIRIN 81 MG/1
81 TABLET, CHEWABLE ORAL DAILY
Qty: 90 TABLET | Refills: 3 | Status: SHIPPED | OUTPATIENT
Start: 2025-07-14

## 2025-08-26 ENCOUNTER — HOSPITAL ENCOUNTER (OUTPATIENT)
Dept: CT IMAGING | Age: 60
Discharge: HOME | End: 2025-08-28
Payer: MEDICAID

## 2025-08-26 DIAGNOSIS — Z87.891 PERSONAL HISTORY OF TOBACCO USE: ICD-10-CM

## 2025-08-26 PROCEDURE — 71271 CT THORAX LUNG CANCER SCR C-: CPT

## (undated) DEVICE — BENTSON WIRE GUIDE 20CM DISTAL FLEXIBILITY WITH SOFTENED TIP: Brand: BENTSON

## (undated) DEVICE — CATHETER DIAG 5FR L110CM PIG CRV SZ 6 SIDE H DBL BRAID WIRE

## (undated) DEVICE — GLIDESHEATH NITINOL HYDROPHILIC COATED INTRODUCER SHEATH: Brand: GLIDESHEATH

## (undated) DEVICE — MERCY TIFFIN CATH LAB PACK: Brand: MEDLINE INDUSTRIES, INC.

## (undated) DEVICE — RADIFOCUS OPTITORQUE ANGIOGRAPHIC CATHETER: Brand: OPTITORQUE

## (undated) DEVICE — GLOVE ORANGE PI 7 1/2   MSG9075

## (undated) DEVICE — DRAPE, RADIAL, STERILE: Brand: MEDLINE

## (undated) DEVICE — CATHETER ANGIO 6FR L110CM ID0.056IN VENT PIG CRV ROBUST